# Patient Record
Sex: FEMALE | Race: BLACK OR AFRICAN AMERICAN | Employment: OTHER | URBAN - METROPOLITAN AREA
[De-identification: names, ages, dates, MRNs, and addresses within clinical notes are randomized per-mention and may not be internally consistent; named-entity substitution may affect disease eponyms.]

---

## 2016-09-08 LAB — LDL-C, EXTERNAL: 60

## 2017-01-11 ENCOUNTER — HOSPITAL ENCOUNTER (OUTPATIENT)
Dept: LAB | Age: 77
Discharge: HOME | End: 2017-01-11
Payer: MEDICARE

## 2017-01-11 PROCEDURE — 84443 ASSAY THYROID STIM HORMONE: CPT

## 2017-01-11 PROCEDURE — 36415 COLL VENOUS BLD VENIPUNCTURE: CPT

## 2017-01-11 PROCEDURE — 86376 MICROSOMAL ANTIBODY EACH: CPT

## 2017-01-11 PROCEDURE — 84439 ASSAY OF FREE THYROXINE: CPT

## 2017-01-18 ENCOUNTER — OFFICE VISIT (OUTPATIENT)
Dept: ENDOCRINOLOGY | Age: 77
End: 2017-01-18

## 2017-01-18 ENCOUNTER — HOSPITAL ENCOUNTER (OUTPATIENT)
Dept: LAB | Age: 77
Discharge: HOME | End: 2017-01-18

## 2017-01-18 VITALS
DIASTOLIC BLOOD PRESSURE: 69 MMHG | HEART RATE: 71 BPM | SYSTOLIC BLOOD PRESSURE: 121 MMHG | TEMPERATURE: 97.5 F | HEIGHT: 65 IN | RESPIRATION RATE: 18 BRPM | BODY MASS INDEX: 34.16 KG/M2 | OXYGEN SATURATION: 95 % | WEIGHT: 205 LBS

## 2017-01-18 DIAGNOSIS — E04.9 NODULAR GOITER: ICD-10-CM

## 2017-01-18 DIAGNOSIS — E05.90 HYPERTHYROIDISM: Primary | ICD-10-CM

## 2017-01-18 RX ORDER — DORZOLAMIDE HYDROCHLORIDE AND TIMOLOL MALEATE 20; 5 MG/ML; MG/ML
1 SOLUTION/ DROPS OPHTHALMIC 2 TIMES DAILY
Refills: 0 | COMMUNITY
Start: 2017-01-16

## 2017-01-18 NOTE — PROGRESS NOTES
[facility] DIABETES & ENDOCRINOLOGY  OFFICE PROCEDURE PROGRESS NOTE        Chart reviewed for the following:   eLydi Stubbs MD, have reviewed the History, Physical and updated the Allergic reactions for Stephanie Conteh     TIME OUT performed immediately prior to start of procedure:   Leydi Stubbs MD, have performed the following reviews on Kaye Roche prior to the start of the procedure:            * Patient was identified by name and date of birth   * Agreement on procedure being performed was verified  * Risks and Benefits explained to the patient  * Procedure site verified and marked as necessary  * Patient was positioned for comfort  * Consent was signed and verified     Time: 3 pm      Date of procedure: 1/18/2017    Procedure performed by:  Clotilde Pringle MD    Provider assisted by: Shamar Abraham      Real time imaging was performed in both transverse and sagittal planes    Nodule size Left mid pole   Following informed consent, a procedural pause was held to confirm patiient identity and site of biopsy. After sterile preparation, FNA guidance was performed using direct ultrasound guidance to confirm accurate needle placement. 5 aspirations were made using 25 G needles  Samples were submitted to cytology  Patient  tolerated procedure well without complications  After care instructions provided.

## 2017-01-18 NOTE — PATIENT INSTRUCTIONS
Please take tylenol 500 mg or Motrin 400 mg (2 pills of 200 mg dose) OTC,  if there is any biopsy site pain    You can go back to your normal routine immediately    If you notice any dime sized redness, at the biopsy site, it is normal and do not worry     If you have more symptoms and signs requiring emergency assistance,  call 911   or go to Emergency room

## 2017-01-18 NOTE — PROGRESS NOTES
Wt Readings from Last 3 Encounters:   01/18/17 205 lb (93 kg)   10/20/16 205 lb (93 kg)     Temp Readings from Last 3 Encounters:   01/18/17 97.5 °F (36.4 °C) (Oral)   10/20/16 97.3 °F (36.3 °C) (Oral)     BP Readings from Last 3 Encounters:   01/18/17 121/69   10/20/16 127/74     Pulse Readings from Last 3 Encounters:   01/18/17 71   10/20/16 61     Lab Results   Component Value Date/Time    TSH <0.006 01/11/2017 11:07 AM    T4, Free 2.47 01/11/2017 11:07 AM

## 2017-01-18 NOTE — MR AVS SNAPSHOT
Visit Information Date & Time Provider Department Dept. Phone Encounter #  
 1/18/2017  2:00 PM Zuly Marin MD [facility]in[facility] [phone] [id number] Follow-up Instructions Return in about 2 months (around 3/18/2017). Upcoming Health Maintenance Date Due DTaP/Tdap/Td series (1 - Tdap) 10/1/1960 ZOSTER VACCINE AGE 60> 10/1/1999 GLAUCOMA SCREENING Q2Y 10/1/2004 OSTEOPOROSIS SCREENING (DEXA) 10/1/2004 Pneumococcal 65+ Low/Medium Risk (1 of 2 - PCV13) 10/1/2004 MEDICARE YEARLY EXAM 10/1/2004 INFLUENZA AGE 9 TO ADULT 8/1/2016 Allergies as of 1/18/2017  Review Complete On: 1/18/2017 By: Zuly Marin MD  
 Not on File Current Immunizations  Never Reviewed No immunizations on file. Not reviewed this visit You Were Diagnosed With   
  
 Codes Comments Hyperthyroidism    -  Primary ICD-10-CM: E05.90 ICD-9-CM: 242.90 Nodular goiter     ICD-10-CM: E04.9 ICD-9-CM: 500. 9 Vitals BP Pulse Temp Resp Height(growth percentile) Weight(growth percentile) 121/69 71 97.5 °F (36.4 °C) (Oral) 18 5' 5\" (1.651 m) 205 lb (93 kg) SpO2 BMI OB Status Smoking Status 95% 34.11 kg/m2 Postmenopausal Former Smoker BMI and BSA Data Body Mass Index Body Surface Area  
 34.11 kg/m 2 2.07 m 2 Your Updated Medication List  
  
   
This list is accurate as of: 1/18/17  3:06 PM.  Always use your most recent med list. amLODIPine 10 mg tablet Commonly known as:  Sonam Fraction Take 1 tablet every day by oral route in the morning for 90 days. atorvastatin 20 mg tablet Commonly known as:  LIPITOR  
atorvastatin 20 mg tablet  
  
 dorzolamide-timolol 22.3-6.8 mg/mL ophthalmic solution Commonly known as:  COSOPT  
  
 hydroCHLOROthiazide 25 mg tablet Commonly known as:  HYDRODIURIL Take 1 tablet every day by oral route in the morning for 90 days. latanoprost 0.005 % ophthalmic solution Commonly known as:  XALATAN  
latanoprost 0.005 % eye drops We Performed the Following FINE NEEDLE ASP;W/IMAGING GUIDANCE C0036536 CPT(R)] SONO GUIDE NEEDLE BIOPSY [19089 CPT(R)] Follow-up Instructions Return in about 2 months (around 3/18/2017). To-Do List   
 01/18/2017 Imaging:  NM THYROID IMAGE UPT SNGL/MULTI Patient Instructions Please take tylenol 500 mg or Motrin 400 mg (2 pills of 200 mg dose) OTC,  if there is any biopsy site pain You can go back to your normal routine immediately If you notice any dime sized redness, at the biopsy site, it is normal and do not worry If you have more symptoms and signs requiring emergency assistance,  call 911   or go to Emergency room Introducing [facility] & HEALTH SERVICES! Cortney Parekh introduces Narr8 patient portal. Now you can access parts of your medical record, email your doctor's office, and request medication refills online. 1. In your internet browser, go to [email or web address] 2. Click on the First Time User? Click Here link in the Sign In box. You will see the New Member Sign Up page. 3. Enter your Narr8 Access Code exactly as it appears below. You will not need to use this code after youve completed the sign-up process. If you do not sign up before the expiration date, you must request a new code. · Narr8 Access Code: [email or web address] Expires: 4/18/2017  3:04 PM 
 
4. Enter the last four digits of your Social Security Number (xxxx) and Date of Birth (mm/dd/yyyy) as indicated and click Submit. You will be taken to the next sign-up page. 5. Create a Narr8 ID. This will be your Narr8 login ID and cannot be changed, so think of one that is secure and easy to remember. 6. Create a Narr8 password. You can change your password at any time. 7. Enter your Password Reset Question and Answer. This can be used at a later time if you forget your password. 8. Enter your e-mail address. You will receive e-mail notification when new information is available in [address] 9. Click Sign Up. You can now view and download portions of your medical record. 10. Click the Download Summary menu link to download a portable copy of your medical information. If you have questions, please visit the Frequently Asked Questions section of the [employer] website. Remember, [employer] is NOT to be used for urgent needs. For medical emergencies, dial 911. Now available from your iPhone and Android! Please provide this summary of care documentation to your next provider. Your primary care clinician is listed as Speedy Wood. If you have any questions after today's visit, please call [phone].

## 2017-01-19 NOTE — PROGRESS NOTES
HISTORY OF PRESENT ILLNESS  Manuel Jessica is a 67 y.o. female. HPI     F/u after Initial visit for low TSH from sept 2016  She has hyper thyroid symptoms like palpitations, weight loss, irritability       She is here for FNA   Her labs showed hyperthyroidism     Past Medical History   Diagnosis Date    Glaucoma      right eye    HTN (hypertension)     Hyperlipidemia     Thyroid condition        Social History     Social History    Marital status: SINGLE     Spouse name: N/A    Number of children: N/A    Years of education: N/A     Occupational History    Not on file. Social History Main Topics    Smoking status: Former Smoker    Smokeless tobacco: Not on file    Alcohol use No    Drug use: No    Sexual activity: Not on file     Other Topics Concern    Not on file     Social History Narrative       History reviewed. No pertinent family history. Review of Systems   Constitutional: Negative. HENT: Negative. Eyes: Negative for pain and redness. Respiratory: Negative. Cardiovascular: Negative for chest pain, palpitations and leg swelling. Gastrointestinal: Negative. Negative for constipation. Genitourinary: Negative. Musculoskeletal: Negative for myalgias. Skin: Negative. Neurological: Negative. Endo/Heme/Allergies: Negative. Psychiatric/Behavioral: Negative for depression and memory loss. The patient does not have insomnia. Physical Exam   Constitutional: She is oriented to person, place, and time. She appears well-developed and well-nourished. HENT:   Head: Normocephalic. Eyes: Conjunctivae and EOM are normal. Pupils are equal, round, and reactive to light. Neck: Normal range of motion. Neck supple. No JVD present. No tracheal deviation present. Thyromegaly present. Cardiovascular: Normal rate, regular rhythm and normal heart sounds. No murmur heard. Pulmonary/Chest: Breath sounds normal.   Abdominal: Soft.  Bowel sounds are normal. Musculoskeletal: Normal range of motion. Lymphadenopathy:     She has no cervical adenopathy. Neurological: She is alert and oriented to person, place, and time. She has normal reflexes. Skin: Skin is warm. Psychiatric: She has a normal mood and affect.       usg from date oct 18 2016 :  Ist is thick at 6 mm and has 0.9 cm nodule   3 left lobe nodules, the superior one is 1.2 cm, middle 3.3 cm and inferior one 1.1 cm     Lab Results  Component Value Date/Time   TSH <0.006 01/11/2017 11:07 AM   T4, Free 2.47 01/11/2017 11:07 AM          ASSESSMENT and PLAN    Hyperthyroidism :  The differentiol includes, Or early graves   Or thyroiditis   Or MNG, toxic     She is symptomatic   Explained to her the need for thyrodi uptake and scan       Educated patient about this disease, adverse effects of untreated hyperthyroidism especially on heart and bone. Discussed options of FISCHER therapy and anti-thyroid drugs. May not participate in vigorous physical activities. lab results reviewed with patient, repeat labs ordered prior to next appointment  orders and follow up discussed with patient      > 50 % of time is spent on counseling           > 50 % of time is spent on counseling

## 2017-01-26 ENCOUNTER — HOSPITAL ENCOUNTER (OUTPATIENT)
Dept: NUCLEAR MEDICINE | Age: 77
Discharge: HOME | End: 2017-01-26
Attending: INTERNAL MEDICINE
Payer: MEDICARE

## 2017-01-26 DIAGNOSIS — E04.9 NODULAR GOITER: ICD-10-CM

## 2017-01-26 DIAGNOSIS — E05.90 HYPERTHYROIDISM: ICD-10-CM

## 2017-01-26 PROCEDURE — A9516 IODINE I-123 SOD IODIDE MIC: HCPCS

## 2017-01-27 ENCOUNTER — HOSPITAL ENCOUNTER (OUTPATIENT)
Dept: NUCLEAR MEDICINE | Age: 77
Discharge: HOME | End: 2017-01-27
Attending: INTERNAL MEDICINE
Payer: MEDICARE

## 2017-01-27 PROCEDURE — 78014 THYROID IMAGING W/BLOOD FLOW: CPT

## 2017-02-17 NOTE — PROGRESS NOTES
I spoke to pt , myself and she is interested in f/u next week Thursday at 3.30 pm also to discuss her thyroid scan results

## 2017-02-23 ENCOUNTER — HOSPITAL ENCOUNTER (OUTPATIENT)
Dept: LAB | Age: 77
Discharge: HOME | End: 2017-02-23
Payer: MEDICARE

## 2017-02-23 ENCOUNTER — OFFICE VISIT (OUTPATIENT)
Dept: ENDOCRINOLOGY | Age: 77
End: 2017-02-23

## 2017-02-23 VITALS
OXYGEN SATURATION: 95 % | WEIGHT: 197 LBS | SYSTOLIC BLOOD PRESSURE: 151 MMHG | HEART RATE: 63 BPM | RESPIRATION RATE: 18 BRPM | HEIGHT: 65 IN | TEMPERATURE: 95.9 F | BODY MASS INDEX: 32.82 KG/M2 | DIASTOLIC BLOOD PRESSURE: 79 MMHG

## 2017-02-23 DIAGNOSIS — E05.00 TOXIC GOITER: Primary | ICD-10-CM

## 2017-02-23 DIAGNOSIS — E06.3 HASHITOXICOSIS: ICD-10-CM

## 2017-02-23 DIAGNOSIS — E04.2 MULTIPLE THYROID NODULES: ICD-10-CM

## 2017-02-23 DIAGNOSIS — E05.80 HASHITOXICOSIS: ICD-10-CM

## 2017-02-23 PROCEDURE — 84439 ASSAY OF FREE THYROXINE: CPT

## 2017-02-23 PROCEDURE — 84443 ASSAY THYROID STIM HORMONE: CPT

## 2017-02-23 NOTE — PROGRESS NOTES
Wt Readings from Last 3 Encounters:   02/23/17 197 lb (89.4 kg)   01/18/17 205 lb (93 kg)   10/20/16 205 lb (93 kg)     Temp Readings from Last 3 Encounters:   02/23/17 95.9 °F (35.5 °C) (Oral)   01/18/17 97.5 °F (36.4 °C) (Oral)   10/20/16 97.3 °F (36.3 °C) (Oral)     BP Readings from Last 3 Encounters:   02/23/17 151/79   01/18/17 121/69   10/20/16 127/74     Pulse Readings from Last 3 Encounters:   02/23/17 63   01/18/17 71   10/20/16 61

## 2017-02-23 NOTE — MR AVS SNAPSHOT
Visit Information Date & Time Provider Department Dept. Phone Encounter #  
 2/23/2017  3:30 PM Varinder Gil MD [facility] Diabetes & Endocrinology [phone] [id number] Follow-up Instructions Return in about 2 months (around 4/23/2017). Upcoming Health Maintenance Date Due DTaP/Tdap/Td series (1 - Tdap) 10/1/1960 ZOSTER VACCINE AGE 60> 10/1/1999 GLAUCOMA SCREENING Q2Y 10/1/2004 OSTEOPOROSIS SCREENING (DEXA) 10/1/2004 Pneumococcal 65+ Low/Medium Risk (1 of 2 - PCV13) 10/1/2004 MEDICARE YEARLY EXAM 10/1/2004 INFLUENZA AGE 9 TO ADULT 8/1/2016 Allergies as of 2/23/2017  Review Complete On: 2/23/2017 By: Enrique Du LPN No Known Allergies Current Immunizations  Never Reviewed No immunizations on file. Not reviewed this visit You Were Diagnosed With   
  
 Codes Comments Toxic goiter    -  Primary ICD-10-CM: E05.00 ICD-9-CM: 242.00 Hashitoxicosis     ICD-10-CM: E05.80, E06.3 ICD-9-CM: 242.80 Vitals BP  
  
  
  
  
  
 151/79 BMI and BSA Data Body Mass Index Body Surface Area 32.78 kg/m 2 2.02 m 2 Your Updated Medication List  
  
   
This list is accurate as of: 2/23/17  5:20 PM.  Always use your most recent med list. amLODIPine 10 mg tablet Commonly known as:  Morrow Freeman Take 1 tablet every day by oral route in the morning for 90 days. atorvastatin 20 mg tablet Commonly known as:  LIPITOR  
atorvastatin 20 mg tablet  
  
 dorzolamide-timolol 22.3-6.8 mg/mL ophthalmic solution Commonly known as:  COSOPT  
  
 hydroCHLOROthiazide 25 mg tablet Commonly known as:  HYDRODIURIL Take 1 tablet every day by oral route in the morning for 90 days. latanoprost 0.005 % ophthalmic solution Commonly known as:  XALATAN  
latanoprost 0.005 % eye drops We Performed the Following T4, FREE [id number] CPT(R)] TSH 3RD GENERATION [89974 CPT(R)] Follow-up Instructions Return in about 2 months (around 4/23/2017). Introducing [facility] & HEALTH SERVICES! Alecia Nicholas introduces [employer] Current patient portal. Now you can access parts of your medical record, email your doctor's office, and request medication refills online. 1. In your internet browser, go to [email or web address] 2. Click on the First Time User? Click Here link in the Sign In box. You will see the New Member Sign Up page. 3. Enter your Cross Current Access Code exactly as it appears below. You will not need to use this code after youve completed the sign-up process. If you do not sign up before the expiration date, you must request a new code. · Cross Current Access Code: [email or web address] Expires: 4/18/2017  3:04 PM 
 
4. Enter the last four digits of your Social Security Number (xxxx) and Date of Birth (mm/dd/yyyy) as indicated and click Submit. You will be taken to the next sign-up page. 5. Create a Cross Current ID. This will be your Cross Current login ID and cannot be changed, so think of one that is secure and easy to remember. 6. Create a Cross Current password. You can change your password at any time. 7. Enter your Password Reset Question and Answer. This can be used at a later time if you forget your password. 8. Enter your e-mail address. You will receive e-mail notification when new information is available in [address] 9. Click Sign Up. You can now view and download portions of your medical record. 10. Click the Download Summary menu link to download a portable copy of your medical information. If you have questions, please visit the Frequently Asked Questions section of the Cross Current website. Remember, Cross Current is NOT to be used for urgent needs. For medical emergencies, dial 911. Now available from your iPhone and Android! Please provide this summary of care documentation to your next provider. Your primary care clinician is listed as Rico Pink. If you have any questions after today's visit, please call [phone].

## 2017-02-23 NOTE — PROGRESS NOTES
HISTORY OF PRESENT ILLNESS  Khadar Leon is a 67 y.o. female. HPI     F/u after last visit for low TSH from jan 2017  She had FNA done on jan 18 2017 - results not discussed with her     She had to be called several times to get her into this visit   She has hyper thyroid symptoms like palpitations, weight loss, irritability   She had labs showing hyperthyroid state - ordered thyrodi scan, not discussed        Past Medical History:   Diagnosis Date    Glaucoma     right eye    HTN (hypertension)     Hyperlipidemia     Thyroid condition        Social History     Social History    Marital status: SINGLE     Spouse name: N/A    Number of children: N/A    Years of education: N/A     Occupational History    Not on file. Social History Main Topics    Smoking status: Former Smoker    Smokeless tobacco: Not on file    Alcohol use No    Drug use: No    Sexual activity: Not on file     Other Topics Concern    Not on file     Social History Narrative       No family history on file. Review of Systems   Constitutional: Negative. HENT: Negative. Eyes: Negative for pain and redness. Respiratory: Negative. Cardiovascular: Negative for chest pain, palpitations and leg swelling. Gastrointestinal: Negative. Negative for constipation. Genitourinary: Negative. Musculoskeletal: Negative for myalgias. Skin: Negative. Neurological: Negative. Endo/Heme/Allergies: Negative. Psychiatric/Behavioral: Negative for depression and memory loss. The patient does not have insomnia. Physical Exam   Constitutional: She is oriented to person, place, and time. She appears well-developed and well-nourished. HENT:   Head: Normocephalic. Eyes: Conjunctivae and EOM are normal. Pupils are equal, round, and reactive to light. Neck: Normal range of motion. Neck supple. No JVD present. No tracheal deviation present. Thyromegaly present.    Cardiovascular: Normal rate, regular rhythm and normal heart sounds. No murmur heard. Pulmonary/Chest: Breath sounds normal.   Abdominal: Soft. Bowel sounds are normal.   Musculoskeletal: Normal range of motion. Lymphadenopathy:     She has no cervical adenopathy. Neurological: She is alert and oriented to person, place, and time. She has normal reflexes. Skin: Skin is warm. Psychiatric: She has a normal mood and affect.       usg from date oct 18 2016 :  Ist is thick at 6 mm and has 0.9 cm nodule   3 left lobe nodules, the superior one is 1.2 cm, middle 3.3 cm and inferior one 1.1 cm       The scan showed diffuse uptake of 40 %         Lab Results   Component Value Date/Time    TSH <0.006 01/11/2017 11:07 AM    T4, Free 2.47 01/11/2017 11:07 AM          ASSESSMENT and PLAN    Hyperthyroidism :  The differentiol includes,  early graves   Or thyroiditis Or MNG, toxic     She is symptomatic    thyrodi uptake and scan favoring Graves disease and Hashitoxicosis       Educated patient about this disease, adverse effects of untreated hyperthyroidism especially on heart and bone. Discussed options of FISCHER therapy and anti-thyroid drugs. Consider Starting on tapazole 10 mg bid, after today's labs confirming hyperthyroidism       May not participate in vigorous physical activities. lab results reviewed with patient, repeat labs ordered prior to next appointment  orders and follow up discussed with patient    2.   Mult thyr nodules, on left lobe   S/p FNA of dominant nodule  Left side --  Got incomplete sample, but the cells which so far were drawn was Benign, reassured the pt         > 50 % of time is spent on counseling       F/u  In 2 months

## 2017-02-24 LAB
T4 FREE SERPL-MCNC: 2.69 NG/DL (ref 0.82–1.77)
TSH SERPL DL<=0.005 MIU/L-ACNC: <0.006 UIU/ML (ref 0.45–4.5)

## 2017-02-25 PROBLEM — E04.2 MULTIPLE THYROID NODULES: Status: ACTIVE | Noted: 2017-02-25

## 2017-03-01 RX ORDER — METHIMAZOLE 10 MG/1
10 TABLET ORAL 2 TIMES DAILY
Qty: 60 TAB | Refills: 6 | Status: SHIPPED | OUTPATIENT
Start: 2017-03-01 | End: 2017-05-08 | Stop reason: SDUPTHER

## 2017-03-01 NOTE — TELEPHONE ENCOUNTER
----- Message from Adalberto Martinez MD sent at 2/24/2017  8:43 AM EST -----  Ok, no change in labs since jan 2017- let us start her on tapazole 10 mg bid dosing

## 2017-05-01 ENCOUNTER — HOSPITAL ENCOUNTER (OUTPATIENT)
Dept: LAB | Age: 77
Discharge: HOME | End: 2017-05-01
Payer: MEDICARE

## 2017-05-01 PROCEDURE — 84443 ASSAY THYROID STIM HORMONE: CPT

## 2017-05-01 PROCEDURE — 84439 ASSAY OF FREE THYROXINE: CPT

## 2017-05-01 PROCEDURE — 36415 COLL VENOUS BLD VENIPUNCTURE: CPT

## 2017-05-08 ENCOUNTER — OFFICE VISIT (OUTPATIENT)
Dept: ENDOCRINOLOGY | Age: 77
End: 2017-05-08

## 2017-05-08 VITALS
RESPIRATION RATE: 20 BRPM | HEIGHT: 65 IN | DIASTOLIC BLOOD PRESSURE: 79 MMHG | TEMPERATURE: 96.8 F | BODY MASS INDEX: 33.15 KG/M2 | WEIGHT: 199 LBS | HEART RATE: 60 BPM | OXYGEN SATURATION: 98 % | SYSTOLIC BLOOD PRESSURE: 125 MMHG

## 2017-05-08 DIAGNOSIS — E05.20 TOXIC MULTINODULAR GOITER: Primary | ICD-10-CM

## 2017-05-08 DIAGNOSIS — R06.02 SOB (SHORTNESS OF BREATH): ICD-10-CM

## 2017-05-08 RX ORDER — METHIMAZOLE 10 MG/1
15 TABLET ORAL 2 TIMES DAILY
Qty: 45 TAB | Refills: 6 | Status: SHIPPED | OUTPATIENT
Start: 2017-05-08 | End: 2017-09-08 | Stop reason: ALTCHOICE

## 2017-05-08 NOTE — MR AVS SNAPSHOT
Visit Information Date & Time Provider Department Dept. Phone Encounter #  
 5/8/2017  1:15 PM Ike Palmer MD [facility] Diabetes & Endocrinology [phone] [id number] Follow-up Instructions Return in about 4 months (around 9/8/2017). Upcoming Health Maintenance Date Due DTaP/Tdap/Td series (1 - Tdap) 10/1/1960 ZOSTER VACCINE AGE 60> 10/1/1999 GLAUCOMA SCREENING Q2Y 10/1/2004 OSTEOPOROSIS SCREENING (DEXA) 10/1/2004 Pneumococcal 65+ Low/Medium Risk (1 of 2 - PCV13) 10/1/2004 MEDICARE YEARLY EXAM 10/1/2004 INFLUENZA AGE 9 TO ADULT 8/1/2017 Allergies as of 5/8/2017  Review Complete On: 5/8/2017 By: Ike Palmer MD  
 No Known Allergies Current Immunizations  Never Reviewed No immunizations on file. Not reviewed this visit You Were Diagnosed With   
  
 Codes Comments Toxic multinodular goiter    -  Primary ICD-10-CM: E05.20 ICD-9-CM: 242.20 SOB (shortness of breath)     ICD-10-CM: R06.02 
ICD-9-CM: 786.05 Vitals BP Pulse Temp Resp Height(growth percentile) Weight(growth percentile) 125/79 60 96.8 °F (36 °C) (Oral) 20 5' 5\" (1.651 m) 199 lb (90.3 kg) SpO2 BMI OB Status Smoking Status 98% 33.12 kg/m2 Postmenopausal Former Smoker BMI and BSA Data Body Mass Index Body Surface Area  
 33.12 kg/m 2 2.04 m 2 Preferred Pharmacy Pharmacy Name Phone [id number] [phone] Your Updated Medication List  
  
   
This list is accurate as of: 5/8/17  1:34 PM.  Always use your most recent med list. amLODIPine 10 mg tablet Commonly known as:  Belen Credit Take 1 tablet every day by oral route in the morning for 90 days. atorvastatin 20 mg tablet Commonly known as:  LIPITOR  
atorvastatin 20 mg tablet  
  
 dorzolamide-timolol 22.3-6.8 mg/mL ophthalmic solution Commonly known as:  COSOPT  
  
 hydroCHLOROthiazide 25 mg tablet Commonly known as:  HYDRODIURIL Take 1 tablet every day by oral route in the morning for 90 days. latanoprost 0.005 % ophthalmic solution Commonly known as:  XALATAN  
latanoprost 0.005 % eye drops  
  
 methIMAzole 10 mg tablet Commonly known as:  TAPAZOLE Take 1.5 Tabs by mouth two (2) times a day. Note the decrease Prescriptions Sent to Pharmacy Refills  
 methIMAzole (TAPAZOLE) 10 mg tablet 6 Sig: Take 1.5 Tabs by mouth two (2) times a day. Note the decrease Class: Normal  
 Pharmacy: [id number] [address] #: [phone] Route: Oral  
  
Follow-up Instructions Return in about 4 months (around 9/8/2017). Patient Instructions Change TAPAZOLE 10 mg  To one and half pills once a day Introducing [facility] & HE[facility] SERVICES! Cristina Mejia introduces Withings patient portal. Now you can access parts of your medical record, email your doctor's office, and request medication refills online. 1. In your internet browser, go to [email or web address] 2. Click on the First Time User? Click Here link in the Sign In box. You will see the New Member Sign Up page. 3. Enter your Withings Access Code exactly as it appears below. You will not need to use this code after youve completed the sign-up process. If you do not sign up before the expiration date, you must request a new code. · Withings Access Code: [email or web address] Expires: 8/6/2017  1:34 PM 
 
4. Enter the last four digits of your Social Security Number (xxxx) and Date of Birth (mm/dd/yyyy) as indicated and click Submit. You will be taken to the next sign-up page. 5. Create a Infotopt ID. This will be your Withings login ID and cannot be changed, so think of one that is secure and easy to remember. 6. Create a Infotopt password. You can change your password at any time. 7. Enter your Password Reset Question and Answer. This can be used at a later time if you forget your password. 8. Enter your e-mail address. You will receive e-mail notification when new information is available in [address] 9. Click Sign Up. You can now view and download portions of your medical record. 10. Click the Download Summary menu link to download a portable copy of your medical information. If you have questions, please visit the Frequently Asked Questions section of the [employer] website. Remember, [employer] is NOT to be used for urgent needs. For medical emergencies, dial 911. Now available from your iPhone and Android! Please provide this summary of care documentation to your next provider. Your primary care clinician is listed as Pattie Shaw. If you have any questions after today's visit, please call [phone].

## 2017-05-08 NOTE — PROGRESS NOTES
Wt Readings from Last 3 Encounters:   05/08/17 199 lb (90.3 kg)   02/23/17 197 lb (89.4 kg)   01/18/17 205 lb (93 kg)     Temp Readings from Last 3 Encounters:   05/08/17 96.8 °F (36 °C) (Oral)   02/23/17 95.9 °F (35.5 °C) (Oral)   01/18/17 97.5 °F (36.4 °C) (Oral)     BP Readings from Last 3 Encounters:   05/08/17 125/79   02/23/17 151/79   01/18/17 121/69     Pulse Readings from Last 3 Encounters:   05/08/17 60   02/23/17 63   01/18/17 71     Lab Results   Component Value Date/Time    TSH 3.040 05/01/2017 09:13 AM    T4, Free 0.98 05/01/2017 09:13 AM

## 2017-05-08 NOTE — PROGRESS NOTES
HISTORY OF PRESENT ILLNESS  Marcia Waggoner is a 67 y.o. female. HPI     F/u after last visit for low TSH from Feb 23 2017    She has hyper thyroid symptoms like palpitations, weight loss, irritability         Past Medical History:   Diagnosis Date    Glaucoma     right eye    HTN (hypertension)     Hyperlipidemia     Thyroid condition        Social History     Social History    Marital status: SINGLE     Spouse name: N/A    Number of children: N/A    Years of education: N/A     Occupational History    Not on file. Social History Main Topics    Smoking status: Former Smoker    Smokeless tobacco: Not on file    Alcohol use No    Drug use: No    Sexual activity: Not on file     Other Topics Concern    Not on file     Social History Narrative       No family history on file. Review of Systems   Constitutional: Negative. HENT: Negative. Eyes: Negative for pain and redness. Respiratory: Negative. Cardiovascular: Negative for chest pain, palpitations and leg swelling. Gastrointestinal: Negative. Negative for constipation. Genitourinary: Negative. Musculoskeletal: Negative for myalgias. Skin: Negative. Neurological: Negative. Endo/Heme/Allergies: Negative. Psychiatric/Behavioral: Negative for depression and memory loss. The patient does not have insomnia. Physical Exam   Constitutional: She is oriented to person, place, and time. She appears well-developed and well-nourished. HENT:   Head: Normocephalic. Eyes: Conjunctivae and EOM are normal. Pupils are equal, round, and reactive to light. Neck: Normal range of motion. Neck supple. No JVD present. No tracheal deviation present. Thyromegaly present. Cardiovascular: Normal rate, regular rhythm and normal heart sounds. No murmur heard. Pulmonary/Chest: Breath sounds normal.   Abdominal: Soft. Bowel sounds are normal.   Musculoskeletal: Normal range of motion.    Lymphadenopathy:     She has no cervical adenopathy. Neurological: She is alert and oriented to person, place, and time. She has normal reflexes. Skin: Skin is warm. Psychiatric: She has a normal mood and affect.       usg from date oct 18 2016 :  Ist is thick at 6 mm and has 0.9 cm nodule   3 left lobe nodules, the superior one is 1.2 cm, middle 3.3 cm and inferior one 1.1 cm       The scan showed diffuse uptake of 40 %         Lab Results   Component Value Date/Time    TSH 3.040 05/01/2017 09:13 AM    T4, Free 0.98 05/01/2017 09:13 AM          ASSESSMENT and PLAN    Hyperthyroidism :  The differentiol includes,  early graves   Or thyroiditis Or MNG, toxic     She is symptomatic with SOB    thyrodi uptake and scan favoring Graves disease and Hashitoxicosis       Educated patient about this disease, adverse effects of untreated hyperthyroidism especially on heart and bone. Discussed options of FISCHER therapy and anti-thyroid drugs. Started  on tapazole 10 mg bid, after last visit  labs confirming hyperthyroidism   She will continue on the same dose     May not participate in vigorous physical activities. lab results reviewed with patient, repeat labs ordered prior to next appointment  orders and follow up discussed with patient    2. Multiple  Thyroid  nodules, on left lobe   S/p FNA of dominant nodule  Left side --  Got incomplete sample, but the cells which so far were drawn was Benign, reassured the pt     3.  SOB - she says she got evaluated by pcp for cardiac etiology   She is told to f/u with pcp     > 50 % of time is spent on counseling       F/u  In 4 months

## 2017-05-31 ENCOUNTER — DOCUMENTATION ONLY (OUTPATIENT)
Dept: ENDOCRINOLOGY | Age: 77
End: 2017-05-31

## 2017-09-01 ENCOUNTER — HOSPITAL ENCOUNTER (OUTPATIENT)
Dept: LAB | Age: 77
Discharge: HOME | End: 2017-09-01
Payer: MEDICARE

## 2017-09-01 PROCEDURE — 84439 ASSAY OF FREE THYROXINE: CPT

## 2017-09-01 PROCEDURE — 84443 ASSAY THYROID STIM HORMONE: CPT

## 2017-09-01 PROCEDURE — 36415 COLL VENOUS BLD VENIPUNCTURE: CPT

## 2017-09-08 ENCOUNTER — OFFICE VISIT (OUTPATIENT)
Dept: ENDOCRINOLOGY | Age: 77
End: 2017-09-08

## 2017-09-08 VITALS
HEART RATE: 51 BPM | DIASTOLIC BLOOD PRESSURE: 67 MMHG | BODY MASS INDEX: 34.66 KG/M2 | SYSTOLIC BLOOD PRESSURE: 123 MMHG | WEIGHT: 208 LBS | TEMPERATURE: 97.8 F | HEIGHT: 65 IN | RESPIRATION RATE: 16 BRPM

## 2017-09-08 DIAGNOSIS — E03.2 HYPOTHYROIDISM DUE TO MEDICATION: Primary | ICD-10-CM

## 2017-09-08 DIAGNOSIS — E05.90 HYPERTHYROIDISM: ICD-10-CM

## 2017-09-08 RX ORDER — LEVOTHYROXINE SODIUM 50 UG/1
50 TABLET ORAL
Qty: 30 TAB | Refills: 6 | Status: SHIPPED | OUTPATIENT
Start: 2017-09-08 | End: 2017-09-08 | Stop reason: CLARIF

## 2017-09-08 RX ORDER — LEVOTHYROXINE SODIUM 50 UG/1
50 TABLET ORAL
Qty: 30 TAB | Refills: 6 | Status: SHIPPED | OUTPATIENT
Start: 2017-09-08 | End: 2018-06-15 | Stop reason: SDUPTHER

## 2017-09-08 NOTE — MR AVS SNAPSHOT
Visit Information Date & Time Provider Department Dept. Phone Encounter #  
 9/8/2017  1:30 PM Miguel A Bonilla MD [facility] [phone] [id number] Follow-up Instructions Return in about 3 months (around 12/8/2017). Upcoming Health Maintenance Date Due DTaP/Tdap/Td series (1 - Tdap) 10/1/1960 ZOSTER VACCINE AGE 60> 8/1/1999 GLAUCOMA SCREENING Q2Y 10/1/2004 OSTEOPOROSIS SCREENING (DEXA) 10/1/2004 Pneumococcal 65+ Low/Medium Risk (1 of 2 - PCV13) 10/1/2004 MEDICARE YEARLY EXAM 10/1/2004 INFLUENZA AGE 9 TO ADULT 8/1/2017 Allergies as of 9/8/2017  Review Complete On: 9/8/2017 By: Miguel A Bonilla MD  
 No Known Allergies Current Immunizations  Never Reviewed No immunizations on file. Not reviewed this visit You Were Diagnosed With   
  
 Codes Comments Hypothyroidism due to medication    -  Primary ICD-10-CM: E03.2 ICD-9-CM: 244.8, E980.5 Hyperthyroidism     ICD-10-CM: E05.90 ICD-9-CM: 242.90 Vitals BP Pulse Temp Resp Height(growth percentile) Weight(growth percentile) 123/67 (BP 1 Location: Left arm, BP Patient Position: Sitting) (!) 51 97.8 °F (36.6 °C) (Oral) 16 5' 5\" (1.651 m) 208 lb (94.3 kg) BMI OB Status Smoking Status 34.61 kg/m2 Postmenopausal Former Smoker BMI and BSA Data Body Mass Index Body Surface Area  
 34.61 kg/m 2 2.08 m 2 Preferred Pharmacy Pharmacy Name Phone [id number] [address] [place] [phone] Your Updated Medication List  
  
   
This list is accurate as of: 9/8/17  2:03 PM.  Always use your most recent med list. amLODIPine 10 mg tablet Commonly known as:  Fortino Hancock Take 1 tablet every day by oral route in the morning for 90 days. atorvastatin 20 mg tablet Commonly known as:  LIPITOR  
atorvastatin 20 mg tablet dorzolamide-timolol 22.3-6.8 mg/mL ophthalmic solution Commonly known as:  COSOPT  
  
 hydroCHLOROthiazide 25 mg tablet Commonly known as:  HYDRODIURIL Take 1 tablet every day by oral route in the morning for 90 days. latanoprost 0.005 % ophthalmic solution Commonly known as:  XALATAN  
latanoprost 0.005 % eye drops  
  
 levothyroxine 50 mcg tablet Commonly known as:  SYNTHROID Take 1 Tab by mouth Daily (before breakfast). Stop Tapazole Prescriptions Sent to Pharmacy Refills  
 levothyroxine (SYNTHROID) 50 mcg tablet 6 Sig: Take 1 Tab by mouth Daily (before breakfast). Stop Tapazole Class: Normal  
 Pharmacy: [id number] [address] #: [phone] Route: Oral  
  
Follow-up Instructions Return in about 3 months (around 12/8/2017). Patient Instructions STOP     TAPAZOLE Synthroid 50 mcg  A day, on empty stomach with water only, no other meds or food or drinks   For next half hour Take any kind of vitamins, calcium, iron   Pills  4 hours later Introducing [address]! Pillo Noland introduces [employer] patient portal. Now you can access parts of your medical record, email your doctor's office, and request medication refills online. 1. In your internet browser, go to [email or web address] 2. Click on the First Time User? Click Here link in the Sign In box. You will see the New Member Sign Up page. 3. Enter your [employer] Access Code exactly as it appears below. You will not need to use this code after youve completed the sign-up process. If you do not sign up before the expiration date, you must request a new code. · [employer] Access Code: [email or web address] Expires: 12/7/2017  2:03 PM 
 
4. Enter the last four digits of your Social Security Number (xxxx) and Date of Birth (mm/dd/yyyy) as indicated and click Submit. You will be taken to the next sign-up page. 5. Create a [facility] ID. This will be your [facility] login ID and cannot be changed, so think of one that is secure and easy to remember. 6. Create a [facility] password. You can change your password at any time. 7. Enter your Password Reset Question and Answer. This can be used at a later time if you forget your password. 8. Enter your e-mail address. You will receive e-mail notification when new information is available in [address] 9. Click Sign Up. You can now view and download portions of your medical record. 10. Click the Download Summary menu link to download a portable copy of your medical information. If you have questions, please visit the Frequently Asked Questions section of the [employer] website. Remember, [facility] is NOT to be used for urgent needs. For medical emergencies, dial 911. Now available from your iPhone and Android! Please provide this summary of care documentation to your next provider. Your primary care clinician is listed as Enrique Casas. If you have any questions after today's visit, please call [phone].

## 2017-09-08 NOTE — PATIENT INSTRUCTIONS
STOP     TAPAZOLE     Synthroid 50 mcg  A day, on empty stomach with water only, no other meds or food or drinks   For next half hour     Take any kind of vitamins, calcium, iron   Pills  4 hours later

## 2017-09-08 NOTE — PROGRESS NOTES
HISTORY OF PRESENT ILLNESS  Angelita Courtney is a 67 y.o. female. HPI     F/u after last visit for thyroid disorder from May 8  2017    She has fatigue, myalgias and sleepiness  Severe   Very pleasant lady         Past Medical History:   Diagnosis Date    Glaucoma     right eye    HTN (hypertension)     Hyperlipidemia     Thyroid condition        Social History     Social History    Marital status: SINGLE     Spouse name: N/A    Number of children: N/A    Years of education: N/A     Occupational History    Not on file. Social History Main Topics    Smoking status: Former Smoker    Smokeless tobacco: Never Used    Alcohol use No    Drug use: No    Sexual activity: Not on file     Other Topics Concern    Not on file     Social History Narrative       No family history on file. Review of Systems   Constitutional: Negative. HENT: Negative. Eyes: Negative for pain and redness. Respiratory: Negative. Cardiovascular: Negative for chest pain, palpitations and leg swelling. Gastrointestinal: Negative. Negative for constipation. Genitourinary: Negative. Musculoskeletal: positive for  myalgias. Skin: Negative. Neurological: Negative. Endo/Heme/Allergies: Negative. Psychiatric/Behavioral: Negative for depression and memory loss. The patient does not have insomnia. Physical Exam   Constitutional: She is oriented to person, place, and time. She appears well-developed and well-nourished. HENT:   Head: Normocephalic. Eyes: Conjunctivae and EOM are normal. Pupils are equal, round, and reactive to light. Neck: Normal range of motion. Neck supple. No JVD present. No tracheal deviation present. Thyromegaly present. Cardiovascular: Normal rate, regular rhythm and normal heart sounds. No murmur heard. Pulmonary/Chest: Breath sounds normal.   Abdominal: Soft. Bowel sounds are normal.   Musculoskeletal: Normal range of motion.    Lymphadenopathy:     She has no cervical adenopathy. Neurological: She is alert and oriented to person, place, and time. She has normal reflexes. Skin: Skin is warm. Psychiatric: She has a normal mood and affect.       usg from date oct 18 2016 :  Ist is thick at 6 mm and has 0.9 cm nodule   3 left lobe nodules, the superior one is 1.2 cm, middle 3.3 cm and inferior one 1.1 cm       The scan showed diffuse uptake of 40 %         Lab Results   Component Value Date/Time    TSH 78.850 09/01/2017 01:20 PM    T4, Free 0.34 09/01/2017 01:20 PM          ASSESSMENT and PLAN      Hypothyroidism :  Medication induced  She never changed the dose of tapazole , requested almost 3 months ago   Sp, stoppin gsynthroid 50 mcg a day        Hyperthyroidism :  The differentiol includes,  early graves   Or thyroiditis Or MNG, toxic  She is symptomatic with SOB    Thyrodi uptake and scan favoring Graves disease and Hashitoxicosis     Educated patient about this disease, adverse effects of untreated hyperthyroidism especially on heart and bone. Discussed options of FISCHER therapy and anti-thyroid drugs. Started  on tapazole 10 mg bid, after last visit  labs confirming hyperthyroidism , later increased to 15 mg bid   Then in the interim, reduced dose to one and half pills a day in may 2017   Pt continued on it 15 mg at bid without changing it     Now, she is hypothyroid and had to STOP TAPAZOLE       2.   Multiple  Thyroid  nodules, on left lobe   S/p FNA of dominant nodule  Left side --  Got incomplete sample, but the cells which so far were drawn was Benign, reassured the pt         > 50 % of time is spent on counseling       F/u  In 3 months

## 2017-10-11 RX ORDER — METHIMAZOLE 10 MG/1
TABLET ORAL
Qty: 60 TAB | Refills: 6 | Status: SHIPPED | OUTPATIENT
Start: 2017-10-11 | End: 2017-12-15 | Stop reason: ALTCHOICE

## 2017-10-20 ENCOUNTER — HOSPITAL ENCOUNTER (OUTPATIENT)
Dept: LAB | Age: 77
Discharge: HOME | End: 2017-10-20
Payer: MEDICARE

## 2017-10-20 PROCEDURE — 84443 ASSAY THYROID STIM HORMONE: CPT

## 2017-10-20 PROCEDURE — 84439 ASSAY OF FREE THYROXINE: CPT

## 2017-10-20 PROCEDURE — 36415 COLL VENOUS BLD VENIPUNCTURE: CPT

## 2017-10-23 ENCOUNTER — OP HISTORICAL/CONVERTED ENCOUNTER (OUTPATIENT)
Dept: OTHER | Age: 77
End: 2017-10-23

## 2017-11-22 ENCOUNTER — TELEPHONE (OUTPATIENT)
Dept: ENDOCRINOLOGY | Age: 77
End: 2017-11-22

## 2017-11-22 NOTE — TELEPHONE ENCOUNTER
----- Message from Cintia Parker sent at 11/22/2017 10:30 AM EST -----  Regarding: Nugaram/telephone  Pt is requesting to know if you want her to stop taking the Levothyroxine. Pts number is [phone].

## 2017-11-22 NOTE — TELEPHONE ENCOUNTER
What provoked this call of stopping synthroid  Now ?     She took tapazole too much and had to stop tapazole for a while ( she is hyperthyroid to begin with )    Now, in oct 2017 labs indicate she is euthroid ( off tapazole but not sure was she on synthrodi or off synthroid then )  Had to start synthroid as she was severely hypothyroid because of too much tapazole

## 2017-11-22 NOTE — TELEPHONE ENCOUNTER
Spoke with patient after speaking with  and informed her she should continue taking Levothyroxine as she has been taking and  will review labs at her next office visit December 15, 2017. Patient verbalized understanding.

## 2017-12-08 ENCOUNTER — HOSPITAL ENCOUNTER (OUTPATIENT)
Dept: LAB | Age: 77
Discharge: HOME | End: 2017-12-08
Payer: MEDICARE

## 2017-12-08 PROCEDURE — 84443 ASSAY THYROID STIM HORMONE: CPT

## 2017-12-08 PROCEDURE — 84439 ASSAY OF FREE THYROXINE: CPT

## 2017-12-08 PROCEDURE — 36415 COLL VENOUS BLD VENIPUNCTURE: CPT

## 2017-12-15 ENCOUNTER — OFFICE VISIT (OUTPATIENT)
Dept: ENDOCRINOLOGY | Age: 77
End: 2017-12-15

## 2017-12-15 VITALS
DIASTOLIC BLOOD PRESSURE: 71 MMHG | OXYGEN SATURATION: 96 % | BODY MASS INDEX: 34.79 KG/M2 | RESPIRATION RATE: 16 BRPM | WEIGHT: 208.8 LBS | SYSTOLIC BLOOD PRESSURE: 139 MMHG | HEIGHT: 65 IN | TEMPERATURE: 95.5 F | HEART RATE: 59 BPM

## 2017-12-15 DIAGNOSIS — E04.9 NODULAR GOITER: ICD-10-CM

## 2017-12-15 DIAGNOSIS — E03.4 HYPOTHYROIDISM DUE TO ACQUIRED ATROPHY OF THYROID: Primary | ICD-10-CM

## 2017-12-15 NOTE — PROGRESS NOTES
HISTORY OF PRESENT ILLNESS  Sima Blanco is a 65 y.o. female. HPI     F/u after last visit for thyroid disorder from sept 8 2017    Very pleasant lady     Pt changed to hypothyroidism from hyperthyroidism   She is asymptomatic       Some confusion occured at pharmacy   She caught it early on         Past Medical History:   Diagnosis Date    Glaucoma     right eye    HTN (hypertension)     Hyperlipidemia     Thyroid condition        Social History     Social History    Marital status: SINGLE     Spouse name: N/A    Number of children: N/A    Years of education: N/A     Occupational History    Not on file. Social History Main Topics    Smoking status: Former Smoker    Smokeless tobacco: Never Used    Alcohol use No    Drug use: No    Sexual activity: Not on file     Other Topics Concern    Not on file     Social History Narrative       History reviewed. No pertinent family history. Review of Systems   Constitutional: Negative. HENT: Negative. Eyes: Negative for pain and redness. Respiratory: Negative. Cardiovascular: Negative for chest pain, palpitations and leg swelling. Gastrointestinal: Negative. Negative for constipation. Genitourinary: Negative. Musculoskeletal: positive for  myalgias. Skin: Negative. Neurological: Negative. Endo/Heme/Allergies: Negative. Psychiatric/Behavioral: Negative for depression and memory loss. The patient does not have insomnia. Physical Exam   Constitutional: She is oriented to person, place, and time. She appears well-developed and well-nourished. HENT:   Head: Normocephalic. Eyes: Conjunctivae and EOM are normal. Pupils are equal, round, and reactive to light. Neck: Normal range of motion. Neck supple. No JVD present. No tracheal deviation present. Thyromegaly present. Cardiovascular: Normal rate, regular rhythm and normal heart sounds. No murmur heard.   Pulmonary/Chest: Breath sounds normal.   Abdominal: Soft. Bowel sounds are normal.   Musculoskeletal: Normal range of motion. Lymphadenopathy:     She has no cervical adenopathy. Neurological: She is alert and oriented to person, place, and time. She has normal reflexes. Skin: Skin is warm. Psychiatric: She has a normal mood and affect.       usg from date oct 18 2016 :  Ist is thick at 6 mm and has 0.9 cm nodule   3 left lobe nodules, the superior one is 1.2 cm, middle 3.3 cm and inferior one 1.1 cm       The scan showed diffuse uptake of 40 %         Lab Results   Component Value Date/Time    TSH 1.730 12/08/2017 02:06 PM    T4, Free 1.61 12/08/2017 02:06 PM          ASSESSMENT and PLAN      Hypothyroidism : Euthyroid   On  synthroid 50 mcg a day        Hyperthyroidism :  The differentiol includes,  early graves   Or thyroiditis Or MNG, toxic  She is symptomatic with SOB    Thyrodi uptake and scan favoring Graves disease and Hashitoxicosis     Educated patient about this disease, adverse effects of untreated hyperthyroidism especially on heart and bone. Discussed options of FISCHER therapy and anti-thyroid drugs. Started  on tapazole 10 mg bid, after last visit  labs confirming hyperthyroidism , later increased to 15 mg bid   Then in the interim, reduced dose to one and half pills a day in may 2017   Pt continued on it 15 mg at bid without changing it   Became hypothyroid and had to STOP TAPAZOLE       2.   Multiple  Thyroid  nodules, on left lobe   S/p FNA of dominant nodule  Left side --  Got incomplete sample, but the cells drawn were Benign, reassured the pt         > 50 % of time is spent on counseling   Patient voiced understanding her plan of care

## 2017-12-15 NOTE — PROGRESS NOTES
Chief Complaint   Patient presents with    Thyroid Problem     1. Have you been to the ER, urgent care clinic since your last visit? Hospitalized since your last visit? No    2. Have you seen or consulted any other health care providers outside of the [address] [place] [address] since your last visit? Include any pap smears or colon screening.  No     Wt Readings from Last 3 Encounters:   12/15/17 208 lb 12.8 oz (94.7 kg)   09/08/17 208 lb (94.3 kg)   05/08/17 199 lb (90.3 kg)     Temp Readings from Last 3 Encounters:   12/15/17 95.5 °F (35.3 °C) (Oral)   09/08/17 97.8 °F (36.6 °C) (Oral)   05/08/17 96.8 °F (36 °C) (Oral)     BP Readings from Last 3 Encounters:   12/15/17 139/71   09/08/17 123/67   05/08/17 125/79     Pulse Readings from Last 3 Encounters:   12/15/17 (!) 59   09/08/17 (!) 51   05/08/17 60

## 2017-12-15 NOTE — MR AVS SNAPSHOT
Visit Information Date & Time Provider Department Dept. Phone Encounter #  
 12/15/2017  1:15 PM Liz Reed MD [facility] [place] [facility] [phone] [id number] Follow-up Instructions Return in about 6 months (around 6/15/2018). Upcoming Health Maintenance Date Due DTaP/Tdap/Td series (1 - Tdap) 10/1/1960 ZOSTER VACCINE AGE 60> 8/1/1999 GLAUCOMA SCREENING Q2Y 10/1/2004 OSTEOPOROSIS SCREENING (DEXA) 10/1/2004 Pneumococcal 65+ Low/Medium Risk (1 of 2 - PCV13) 10/1/2004 MEDICARE YEARLY EXAM 10/1/2004 Influenza Age 5 to Adult 8/1/2017 Allergies as of 12/15/2017  Review Complete On: 12/15/2017 By: Liz Reed MD  
 No Known Allergies Current Immunizations  Never Reviewed No immunizations on file. Not reviewed this visit You Were Diagnosed With   
  
 Codes Comments Hypothyroidism due to acquired atrophy of thyroid    -  Primary ICD-10-CM: E03.4 ICD-9-CM: 244.8, 246.8 Vitals BP Pulse Temp Resp Height(growth percentile) Weight(growth percentile) 139/71 (BP 1 Location: Right arm, BP Patient Position: Sitting) (!) 59 95.5 °F (35.3 °C) (Oral) 16 5' 5\" (1.651 m) 208 lb 12.8 oz (94.7 kg) SpO2 BMI OB Status Smoking Status 96% 34.75 kg/m2 Postmenopausal Former Smoker BMI and BSA Data Body Mass Index Body Surface Area 34.75 kg/m 2 2.08 m 2 Preferred Pharmacy Pharmacy Name Phone [facility] [address] [place] [phone] Your Updated Medication List  
  
   
This list is accurate as of: 12/15/17  2:10 PM.  Always use your most recent med list. amLODIPine 10 mg tablet Commonly known as:  Quinn Rodríguez Take 1 tablet every day by oral route in the morning for 90 days. atorvastatin 20 mg tablet Commonly known as:  LIPITOR  
atorvastatin 20 mg tablet  
  
 dorzolamide-timolol 22.3-6.8 mg/mL ophthalmic solution Commonly known as:  COSOPT  
  
 hydroCHLOROthiazide 25 mg tablet Commonly known as:  HYDRODIURIL Take 1 tablet every day by oral route in the morning for 90 days. latanoprost 0.005 % ophthalmic solution Commonly known as:  XALATAN  
latanoprost 0.005 % eye drops  
  
 levothyroxine 50 mcg tablet Commonly known as:  SYNTHROID Take 1 Tab by mouth Daily (before breakfast). Stop Tapazole Follow-up Instructions Return in about 6 months (around 6/15/2018). Patient Instructions STOP     TAPAZOLE Synthroid 50 mcg  A day, on empty stomach with water only, no other meds or food or drinks   For next half hour Take any kind of vitamins, calcium, iron   Pills  4 hours later Introducing [facility]! Arlette Turner introduces [employer] patient portal. Now you can access parts of your medical record, email your doctor's office, and request medication refills online. 1. In your internet browser, go to [email or web address] 2. Click on the First Time User? Click Here link in the Sign In box. You will see the New Member Sign Up page. 3. Enter your [employer] Access Code exactly as it appears below. You will not need to use this code after youve completed the sign-up process. If you do not sign up before the expiration date, you must request a new code. · [employer] Access Code: [email or web address] Expires: 3/15/2018  2:10 PM 
 
4. Enter the last four digits of your Social Security Number (xxxx) and Date of Birth (mm/dd/yyyy) as indicated and click Submit. You will be taken to the next sign-up page. 5. Create a [employer] ID. This will be your [employer] login ID and cannot be changed, so think of one that is secure and easy to remember. 6. Create a [employer] password. You can change your password at any time. 7. Enter your Password Reset Question and Answer. This can be used at a later time if you forget your password. 8. Enter your e-mail address. You will receive e-mail notification when new information is available in [address] 9. Click Sign Up. You can now view and download portions of your medical record. 10. Click the Download Summary menu link to download a portable copy of your medical information. If you have questions, please visit the Frequently Asked Questions section of the [facility] website. Remember, [facility] is NOT to be used for urgent needs. For medical emergencies, dial 911. Now available from your iPhone and Android! Please provide this summary of care documentation to your next provider. Your primary care clinician is listed as Corinne Weldon. If you have any questions after today's visit, please call [phone].

## 2018-02-07 ENCOUNTER — OP HISTORICAL/CONVERTED ENCOUNTER (OUTPATIENT)
Dept: OTHER | Age: 78
End: 2018-02-07

## 2018-04-08 RX ORDER — LEVOTHYROXINE SODIUM 50 UG/1
TABLET ORAL
Qty: 30 TAB | Refills: 6 | Status: SHIPPED | OUTPATIENT
Start: 2018-04-08 | End: 2018-06-15 | Stop reason: SDUPTHER

## 2018-06-08 ENCOUNTER — LAB ONLY (OUTPATIENT)
Dept: ENDOCRINOLOGY | Age: 78
End: 2018-06-08

## 2018-06-08 ENCOUNTER — HOSPITAL ENCOUNTER (OUTPATIENT)
Dept: LAB | Age: 78
Discharge: HOME | End: 2018-06-08
Payer: MEDICARE

## 2018-06-08 DIAGNOSIS — E03.4 HYPOTHYROIDISM DUE TO ACQUIRED ATROPHY OF THYROID: ICD-10-CM

## 2018-06-08 PROCEDURE — 85025 COMPLETE CBC W/AUTO DIFF WBC: CPT

## 2018-06-08 PROCEDURE — 84443 ASSAY THYROID STIM HORMONE: CPT

## 2018-06-08 PROCEDURE — 84439 ASSAY OF FREE THYROXINE: CPT

## 2018-06-09 LAB
BASOPHILS # BLD AUTO: 0 X10E3/UL (ref 0–0.2)
BASOPHILS NFR BLD AUTO: 1 %
EOSINOPHIL # BLD AUTO: 0.4 X10E3/UL (ref 0–0.4)
EOSINOPHIL NFR BLD AUTO: 9 %
ERYTHROCYTE [DISTWIDTH] IN BLOOD BY AUTOMATED COUNT: 15.1 % (ref 12.3–15.4)
HCT VFR BLD AUTO: 30.2 % (ref 34–46.6)
HGB BLD-MCNC: 9.4 G/DL (ref 11.1–15.9)
IMM GRANULOCYTES # BLD: 0 X10E3/UL (ref 0–0.1)
IMM GRANULOCYTES NFR BLD: 0 %
LYMPHOCYTES # BLD AUTO: 2.2 X10E3/UL (ref 0.7–3.1)
LYMPHOCYTES NFR BLD AUTO: 50 %
MCH RBC QN AUTO: 26.4 PG (ref 26.6–33)
MCHC RBC AUTO-ENTMCNC: 31.1 G/DL (ref 31.5–35.7)
MCV RBC AUTO: 85 FL (ref 79–97)
MONOCYTES # BLD AUTO: 0.4 X10E3/UL (ref 0.1–0.9)
MONOCYTES NFR BLD AUTO: 10 %
NEUTROPHILS # BLD AUTO: 1.3 X10E3/UL (ref 1.4–7)
NEUTROPHILS NFR BLD AUTO: 30 %
PLATELET # BLD AUTO: 349 X10E3/UL (ref 150–379)
RBC # BLD AUTO: 3.56 X10E6/UL (ref 3.77–5.28)
T4 FREE SERPL-MCNC: 1.63 NG/DL (ref 0.82–1.77)
TSH SERPL DL<=0.005 MIU/L-ACNC: 1.91 UIU/ML (ref 0.45–4.5)
WBC # BLD AUTO: 4.3 X10E3/UL (ref 3.4–10.8)

## 2018-06-15 ENCOUNTER — OFFICE VISIT (OUTPATIENT)
Dept: ENDOCRINOLOGY | Age: 78
End: 2018-06-15

## 2018-06-15 VITALS
SYSTOLIC BLOOD PRESSURE: 119 MMHG | TEMPERATURE: 96.4 F | WEIGHT: 205.7 LBS | BODY MASS INDEX: 34.27 KG/M2 | HEIGHT: 65 IN | OXYGEN SATURATION: 97 % | RESPIRATION RATE: 18 BRPM | HEART RATE: 66 BPM | DIASTOLIC BLOOD PRESSURE: 77 MMHG

## 2018-06-15 DIAGNOSIS — E03.2 HYPOTHYROIDISM DUE TO MEDICATION: ICD-10-CM

## 2018-06-15 DIAGNOSIS — D64.9 ANEMIA, UNSPECIFIED TYPE: ICD-10-CM

## 2018-06-15 DIAGNOSIS — E03.4 HYPOTHYROIDISM DUE TO ACQUIRED ATROPHY OF THYROID: Primary | ICD-10-CM

## 2018-06-15 DIAGNOSIS — E05.90 HYPERTHYROIDISM: ICD-10-CM

## 2018-06-15 RX ORDER — LEVOTHYROXINE SODIUM 50 UG/1
50 TABLET ORAL
Qty: 30 TAB | Refills: 6 | Status: SHIPPED | OUTPATIENT
Start: 2018-06-15 | End: 2019-06-19 | Stop reason: SDUPTHER

## 2018-06-15 NOTE — PATIENT INSTRUCTIONS
--------------------------------------------------------------------------------------------    Refills    -    please call your pharmacy and have them send us a refill request    Results  -  allow up to a week for lab results to be processed and reviewed. Phone calls  -  Allow upto 24 hrs.  for non-urgent calls to be retained    Prior authorization - It may take up to 2 weeks to process, depending on your insurance    Forms  -  FMLA, DMV, patient assistance, etc. will take up to 2 weeks to process    Cancellations - please notify the office in advance if you cannot keep your appointment    Samples  - will only be dispensed at visits as supply is limited      If you are having a medical emergency call 911    --------------------------------------------------------------------------------------------      STOP     TAPAZOLE     Synthroid 50 mcg  A day, on empty stomach with water only, no other meds or food or drinks   For next half hour     Take any kind of vitamins, calcium, iron   Pills  4 hours later

## 2018-06-15 NOTE — PROGRESS NOTES
Wt Readings from Last 3 Encounters:   06/15/18 205 lb 11.2 oz (93.3 kg)   12/15/17 208 lb 12.8 oz (94.7 kg)   09/08/17 208 lb (94.3 kg)     Temp Readings from Last 3 Encounters:   06/15/18 96.4 °F (35.8 °C) (Oral)   12/15/17 95.5 °F (35.3 °C) (Oral)   09/08/17 97.8 °F (36.6 °C) (Oral)     BP Readings from Last 3 Encounters:   06/15/18 119/77   12/15/17 139/71   09/08/17 123/67     Pulse Readings from Last 3 Encounters:   06/15/18 66   12/15/17 (!) 59   09/08/17 (!) 51

## 2018-06-15 NOTE — PROGRESS NOTES
HISTORY OF PRESENT ILLNESS  Lisset Conner is a 65 y.o. female. HPI     F/u after last visit for hypothyroidism  from Dec  2017      She stopped statin 3 weeks ago  Per my advise   She has not felt any difference     Compliant with synthroid           Old history     Very pleasant lady     Pt changed to hypothyroidism from hyperthyroidism   She is asymptomatic       Some confusion occured at pharmacy   She caught it early on         Past Medical History:   Diagnosis Date    Glaucoma     right eye    HTN (hypertension)     Hyperlipidemia     Thyroid condition        Social History     Social History    Marital status: SINGLE     Spouse name: N/A    Number of children: N/A    Years of education: N/A     Occupational History    Not on file. Social History Main Topics    Smoking status: Former Smoker    Smokeless tobacco: Never Used    Alcohol use No    Drug use: No    Sexual activity: Not on file     Other Topics Concern    Not on file     Social History Narrative       History reviewed. No pertinent family history. Review of Systems   Constitutional: Negative. HENT: Negative. Eyes: Negative for pain and redness. Respiratory: Negative. Cardiovascular: Negative for chest pain, palpitations and leg swelling. Gastrointestinal: Negative. Negative for constipation. Genitourinary: Negative. Musculoskeletal: positive for  myalgias. Skin: Negative. Neurological: Negative. Endo/Heme/Allergies: Negative. Psychiatric/Behavioral: Negative for depression and memory loss. The patient does not have insomnia. Physical Exam   Constitutional: She is oriented to person, place, and time. She appears well-developed and well-nourished. HENT:   Head: Normocephalic. Eyes: Conjunctivae and EOM are normal. Pupils are equal, round, and reactive to light. Neck: Normal range of motion. Neck supple. No JVD present. No tracheal deviation present. Thyromegaly present. Cardiovascular: Normal rate, regular rhythm and normal heart sounds. No murmur heard. Pulmonary/Chest: Breath sounds normal.   Abdominal: Soft. Bowel sounds are normal.   Musculoskeletal: Normal range of motion. Lymphadenopathy:     She has no cervical adenopathy. Neurological: She is alert and oriented to person, place, and time. She has normal reflexes. Skin: Skin is warm. Psychiatric: She has a normal mood and affect.       usg from date oct 18 2016 :  Ist is thick at 6 mm and has 0.9 cm nodule   3 left lobe nodules, the superior one is 1.2 cm, middle 3.3 cm and inferior one 1.1 cm       The scan showed diffuse uptake of 40 %         Lab Results   Component Value Date/Time    TSH 1.910 06/08/2018 12:00 AM    T4, Free 1.63 06/08/2018 12:00 AM          ASSESSMENT and PLAN      Hypothyroidism : Euthyroid   On  synthroid 50 mcg a day  Educated on pill intake       Hyperthyroidism :  Resolved   Thyrodi uptake and scan favoring Graves disease / Hashitoxicosis   Started  on tapazole 10 mg bid, after last visit  labs confirming hyperthyroidism , later increased to 15 mg bid   Then in the interim, reduced dose to one and half pills a day in may 2017   Pt continued on it 15 mg at bid without changing it   Became hypothyroid and had to STOP TAPAZOLE       2. Multiple  Thyroid  nodules, on left lobe   S/p FNA of dominant nodule  Left side --  Got incomplete sample, but the cells drawn were Benign, reassured the pt  Jan 2017   Not considering to repeat       3. Anemia :  HGB is below 10   To f/u with her pcp     4.   Pt remembers that I told her to stop STATIN - lipitor 20 mg hs   I do not remember the exact reason but for her age over 75 yrs, there is no strong evidence to support statin use     Pt has not felt any difference in pains since stopping statin, so left to her choice   I do not have lipid profile  to advise her           > 50 % of time is spent on counseling   Patient voiced understanding her plan of care

## 2018-06-15 NOTE — MR AVS SNAPSHOT
[address] [id number] 
[phone] Patient: Yvonne Avalos MRN: [id number] :1940 Visit Information Date & Time Provider Department Dept. Phone Encounter #  
 6/15/2018  1:30 PM Pema Arshad MD [facility] [phone] [id number] Follow-up Instructions Return in about 1 year (around 6/15/2019). Upcoming Health Maintenance Date Due DTaP/Tdap/Td series (1 - Tdap) 10/1/1960 ZOSTER VACCINE AGE 60> 1999 GLAUCOMA SCREENING Q2Y 10/1/2004 Bone Densitometry (Dexa) Screening 10/1/2004 Pneumococcal 65+ Low/Medium Risk (1 of 2 - PCV13) 10/1/2004 MEDICARE YEARLY EXAM 3/14/2018 Influenza Age 5 to Adult 2018 Allergies as of 6/15/2018  Review Complete On: 6/15/2018 By: Pema Arshad MD  
 No Known Allergies Current Immunizations  Never Reviewed No immunizations on file. Not reviewed this visit You Were Diagnosed With   
  
 Codes Comments Hypothyroidism due to acquired atrophy of thyroid    -  Primary ICD-10-CM: E03.4 ICD-9-CM: 244.8, 246.8 Anemia, unspecified type     ICD-10-CM: D64.9 ICD-9-CM: 144. 9 Vitals BP Pulse Temp Resp Height(growth percentile) Weight(growth percentile) 119/77 (BP 1 Location: Right arm, BP Patient Position: Sitting) 66 96.4 °F (35.8 °C) (Oral) 18 5' 5\" (1.651 m) 205 lb 11.2 oz (93.3 kg) SpO2 BMI OB Status Smoking Status 97% 34.23 kg/m2 Postmenopausal Former Smoker Vitals History BMI and BSA Data Body Mass Index Body Surface Area  
 34.23 kg/m 2 2.07 m 2 Preferred Pharmacy Pharmacy Name Phone [id number] [address] [phone] Your Updated Medication List  
  
   
This list is accurate as of 6/15/18  2:07 PM.  Always use your most recent med list. amLODIPine 10 mg tablet Commonly known as:  Simon De Los Santos Take 1 tablet every day by oral route in the morning for 90 days. atorvastatin 20 mg tablet Commonly known as:  LIPITOR  
atorvastatin 20 mg tablet  
  
 dorzolamide-timolol 22.3-6.8 mg/mL ophthalmic solution Commonly known as:  COSOPT  
  
 hydroCHLOROthiazide 25 mg tablet Commonly known as:  HYDRODIURIL Take 1 tablet every day by oral route in the morning for 90 days. latanoprost 0.005 % ophthalmic solution Commonly known as:  XALATAN  
latanoprost 0.005 % eye drops  
  
 levothyroxine 50 mcg tablet Commonly known as:  SYNTHROID Take 1 Tab by mouth Daily (before breakfast). Stop Tapazole Follow-up Instructions Return in about 1 year (around 6/15/2019). Patient Instructions   
-------------------------------------------------------------------------------------------- Refills    -    please call your pharmacy and have them send us a refill request 
 
Results  -  allow up to a week for lab results to be processed and reviewed. Phone calls  -  Allow upto 24 hrs. for non-urgent calls to be retained Prior authorization - It may take up to 2 weeks to process, depending on your insurance Forms  -  FMLA, DMV, patient assistance, etc. will take up to 2 weeks to process Cancellations - please notify the office in advance if you cannot keep your appointment Samples  - will only be dispensed at visits as supply is limited If you are having a medical emergency call 911 
 
-------------------------------------------------------------------------------------------- 
 
 
STOP     TAPAZOLE Synthroid 50 mcg  A day, on empty stomach with water only, no other meds or food or drinks   For next half hour Take any kind of vitamins, calcium, iron   Pills  4 hours later Introducing [facility]!    
 [employer] introduces [employer] patient portal. Now you can access parts of your medical record, email your doctor's office, and request medication refills online. 1. In your internet browser, go to [email or web address]. [employer] 2. Click on the First Time User? Click Here link in the Sign In box. You will see the New Member Sign Up page. 3. Enter your Sezion Access Code exactly as it appears below. You will not need to use this code after youve completed the sign-up process. If you do not sign up before the expiration date, you must request a new code. · Sezion Access Code: [email or web address] Expires: 9/13/2018  2:07 PM 
 
4. Enter the last four digits of your Social Security Number (xxxx) and Date of Birth (mm/dd/yyyy) as indicated and click Submit. You will be taken to the next sign-up page. 5. Create a Sezion ID. This will be your Sezion login ID and cannot be changed, so think of one that is secure and easy to remember. 6. Create a Sezion password. You can change your password at any time. 7. Enter your Password Reset Question and Answer. This can be used at a later time if you forget your password. 8. Enter your e-mail address. You will receive e-mail notification when new information is available in [address] 9. Click Sign Up. You can now view and download portions of your medical record. 10. Click the Download Summary menu link to download a portable copy of your medical information. If you have questions, please visit the Frequently Asked Questions section of the Sezion website. Remember, Se[employer]ion is NOT to be used for urgent needs. For medical emergencies, dial 911. Now available from your iPhone and Android! Please provide this summary of care documentation to your next provider. Your primary care clinician is listed as Corey Carroll. If you have any questions after today's visit, please call [phone].

## 2018-10-25 ENCOUNTER — OP HISTORICAL/CONVERTED ENCOUNTER (OUTPATIENT)
Dept: OTHER | Age: 78
End: 2018-10-25

## 2018-12-03 ENCOUNTER — OP HISTORICAL/CONVERTED ENCOUNTER (OUTPATIENT)
Dept: OTHER | Age: 78
End: 2018-12-03

## 2019-01-04 ENCOUNTER — OP HISTORICAL/CONVERTED ENCOUNTER (OUTPATIENT)
Dept: OTHER | Age: 79
End: 2019-01-04

## 2019-06-12 ENCOUNTER — HOSPITAL ENCOUNTER (OUTPATIENT)
Dept: LAB | Age: 79
Discharge: HOME | End: 2019-06-12
Payer: MEDICARE

## 2019-06-12 DIAGNOSIS — D64.9 ANEMIA, UNSPECIFIED TYPE: ICD-10-CM

## 2019-06-12 DIAGNOSIS — E03.4 HYPOTHYROIDISM DUE TO ACQUIRED ATROPHY OF THYROID: ICD-10-CM

## 2019-06-12 PROCEDURE — 36415 COLL VENOUS BLD VENIPUNCTURE: CPT

## 2019-06-12 PROCEDURE — 85025 COMPLETE CBC W/AUTO DIFF WBC: CPT

## 2019-06-12 PROCEDURE — 84443 ASSAY THYROID STIM HORMONE: CPT

## 2019-06-12 PROCEDURE — 84439 ASSAY OF FREE THYROXINE: CPT

## 2019-06-13 LAB
BASOPHILS # BLD AUTO: 0 X10E3/UL (ref 0–0.2)
BASOPHILS NFR BLD AUTO: 0 %
EOSINOPHIL # BLD AUTO: 0.4 X10E3/UL (ref 0–0.4)
EOSINOPHIL NFR BLD AUTO: 8 %
ERYTHROCYTE [DISTWIDTH] IN BLOOD BY AUTOMATED COUNT: 15.3 % (ref 12.3–15.4)
HCT VFR BLD AUTO: 36.1 % (ref 34–46.6)
HGB BLD-MCNC: 12 G/DL (ref 11.1–15.9)
IMM GRANULOCYTES # BLD AUTO: 0 X10E3/UL (ref 0–0.1)
IMM GRANULOCYTES NFR BLD AUTO: 0 %
LYMPHOCYTES # BLD AUTO: 2 X10E3/UL (ref 0.7–3.1)
LYMPHOCYTES NFR BLD AUTO: 44 %
MCH RBC QN AUTO: 31.9 PG (ref 26.6–33)
MCHC RBC AUTO-ENTMCNC: 33.2 G/DL (ref 31.5–35.7)
MCV RBC AUTO: 96 FL (ref 79–97)
MONOCYTES # BLD AUTO: 0.4 X10E3/UL (ref 0.1–0.9)
MONOCYTES NFR BLD AUTO: 9 %
NEUTROPHILS # BLD AUTO: 1.8 X10E3/UL (ref 1.4–7)
NEUTROPHILS NFR BLD AUTO: 39 %
PLATELET # BLD AUTO: 302 X10E3/UL (ref 150–450)
RBC # BLD AUTO: 3.76 X10E6/UL (ref 3.77–5.28)
T4 FREE SERPL-MCNC: 1.62 NG/DL (ref 0.82–1.77)
TSH SERPL DL<=0.005 MIU/L-ACNC: 1.71 UIU/ML (ref 0.45–4.5)
WBC # BLD AUTO: 4.6 X10E3/UL (ref 3.4–10.8)

## 2019-06-19 ENCOUNTER — OFFICE VISIT (OUTPATIENT)
Dept: ENDOCRINOLOGY | Age: 79
End: 2019-06-19

## 2019-06-19 VITALS
SYSTOLIC BLOOD PRESSURE: 123 MMHG | DIASTOLIC BLOOD PRESSURE: 67 MMHG | BODY MASS INDEX: 33.61 KG/M2 | HEART RATE: 60 BPM | OXYGEN SATURATION: 96 % | TEMPERATURE: 97.1 F | HEIGHT: 65 IN | RESPIRATION RATE: 18 BRPM | WEIGHT: 201.7 LBS

## 2019-06-19 DIAGNOSIS — E03.2 HYPOTHYROIDISM DUE TO MEDICATION: ICD-10-CM

## 2019-06-19 DIAGNOSIS — E78.2 MIXED HYPERLIPIDEMIA: ICD-10-CM

## 2019-06-19 DIAGNOSIS — E04.2 MULTINODULAR GOITER: Primary | ICD-10-CM

## 2019-06-19 RX ORDER — LEVOTHYROXINE SODIUM 50 UG/1
50 TABLET ORAL
Qty: 90 TAB | Refills: 4 | Status: SHIPPED | OUTPATIENT
Start: 2019-06-19 | End: 2020-06-18 | Stop reason: SDUPTHER

## 2019-06-19 NOTE — LETTER
6/19/19 Patient: Melinda Farrell YOB: 1940 Date of Visit: 6/19/2019 Kayce Ren DO 
[address] [facility] [address] [place] [address] VIA Facsimile: [phone] Dear Kayce Ren DO, Thank you for referring Ms. Melinda Farrell to [id number] [place] [address] for evaluation. My notes for this consultation are attached. If you have questions, please do not hesitate to call me. I look forward to following your patient along with you. Sincerely, John Figueroa MD

## 2019-06-19 NOTE — PROGRESS NOTES
1. Have you been to the ER, urgent care clinic since your last visit? No  Hospitalized since your last visit? No    2. Have you seen or consulted any other health care providers outside of the [address] [place] [address] since your last visit? Include any pap smears or colon screening.  No    Wt Readings from Last 3 Encounters:   06/19/19 201 lb 11.2 oz (91.5 kg)   06/15/18 205 lb 11.2 oz (93.3 kg)   12/15/17 208 lb 12.8 oz (94.7 kg)     Temp Readings from Last 3 Encounters:   06/19/19 97.1 °F (36.2 °C) (Oral)   06/15/18 96.4 °F (35.8 °C) (Oral)   12/15/17 95.5 °F (35.3 °C) (Oral)     BP Readings from Last 3 Encounters:   06/19/19 123/67   06/15/18 119/77   12/15/17 139/71     Pulse Readings from Last 3 Encounters:   06/19/19 60   06/15/18 66   12/15/17 (!) 59

## 2019-06-19 NOTE — PATIENT INSTRUCTIONS
-------------------------------------------------------------------------------------------- Refills    -    please call your pharmacy and have them send us a refill request 
 
Results  -  allow up to a week for lab results to be processed and reviewed. Phone calls  -  Allow upto 24 hrs. for non-urgent calls to be retained Prior authorization - It may take up to 2 weeks to process, depending on your insurance Forms  -  FMLA, DMV, patient assistance, etc. will take up to 2 weeks to process Cancellations - please notify the office in advance if you cannot keep your appointment Samples  - will only be dispensed at visits as supply is limited If you are having a medical emergency call 911 
 
-------------------------------------------------------------------------------------------- Synthroid 50 mcg  A day, on empty stomach with water only, no other meds or food or drinks   For next half hour Take any kind of vitamins, calcium, iron   Pills  4 hours later

## 2019-06-19 NOTE — PROGRESS NOTES
HISTORY OF PRESENT ILLNESS  Luis Patel is a 77 y.o. female.   HPI     F/u after last visit for hypothyroidism  from June 2018     In the interim , pt had seen hematologist   Had transfusions twice     Compliant  With synthroid             Old history  :   She stopped statin 3 weeks ago  Per my advise   She has not felt any difference   Compliant with synthroid           Old history     Very pleasant lady     Pt changed to hypothyroidism from hyperthyroidism   She is asymptomatic       Some confusion occured at pharmacy   She caught it early on         Past Medical History:   Diagnosis Date    Glaucoma     right eye    HTN (hypertension)     Hyperlipidemia     Thyroid condition        Social History     Socioeconomic History    Marital status: SINGLE     Spouse name: Not on file    Number of children: Not on file    Years of education: Not on file    Highest education level: Not on file   Occupational History    Not on file   Social Needs    Financial resource strain: Not on file    Food insecurity:     Worry: Not on file     Inability: Not on file    Transportation needs:     Medical: Not on file     Non-medical: Not on file   Tobacco Use    Smoking status: Former Smoker    Smokeless tobacco: Never Used   Substance and Sexual Activity    Alcohol use: No    Drug use: No    Sexual activity: Not on file   Lifestyle    Physical activity:     Days per week: Not on file     Minutes per session: Not on file    Stress: Not on file   Relationships    Social connections:     Talks on phone: Not on file     Gets together: Not on file     Attends [employer] service: Not on file     Active member of club or organization: Not on file     Attends meetings of clubs or organizations: Not on file     Relationship status: Not on file    Intimate partner violence:     Fear of current or ex partner: Not on file     Emotionally abused: Not on file     Physically abused: Not on file     Forced sexual activity: Not on file   Other Topics Concern    Not on file   Social History Narrative    Not on file       History reviewed. No pertinent family history. Review of Systems   Constitutional: Negative. HENT: Negative. Eyes: Negative for pain and redness. Respiratory: Negative. Cardiovascular: Negative for chest pain, palpitations and leg swelling. Gastrointestinal: Negative. Negative for constipation. Genitourinary: Negative. Musculoskeletal: positive for  myalgias. Skin: Negative. Neurological: Negative. Endo/Heme/Allergies: Negative. Psychiatric/Behavioral: Negative for depression and memory loss. The patient does not have insomnia. Physical Exam   Constitutional: She is oriented to person, place, and time. She appears well-developed and well-nourished. HENT:   Head: Normocephalic. Eyes: Conjunctivae and EOM are normal. Pupils are equal, round, and reactive to light. Neck: Normal range of motion. Neck supple. No JVD present. No tracheal deviation present. Thyromegaly present. Cardiovascular: Normal rate, regular rhythm and normal heart sounds. No murmur heard. Pulmonary/Chest: Breath sounds normal.   Abdominal: Soft. Bowel sounds are normal.   Musculoskeletal: Normal range of motion. Lymphadenopathy:     She has no cervical adenopathy. Neurological: She is alert and oriented to person, place, and time. She has normal reflexes. Skin: Skin is warm.    Psychiatric: She has a normal mood and affect.       usg from date oct 18 2016 :  Ist is thick at 6 mm and has 0.9 cm nodule   3 left lobe nodules, the superior one is 1.2 cm, middle 3.3 cm and inferior one 1.1 cm       The scan showed diffuse uptake of 40 %         Lab Results   Component Value Date/Time    TSH 1.710 06/12/2019 01:40 PM    T4, Free 1.62 06/12/2019 01:40 PM          ASSESSMENT and PLAN      Hypothyroidism : Euthyroid   On  synthroid 50 mcg a day  Educated on pill intake       Hyperthyroidism :  Resolved Thyrodi uptake and scan favoring Graves disease / Hashitoxicosis   Started  on tapazole 10 mg bid, after last visit  labs confirming hyperthyroidism , later increased to 15 mg bid   Then in the interim, reduced dose to one and half pills a day in may 2017   Pt continued on it 15 mg at bid without changing it   Became hypothyroid and had to STOP TAPAZOLE       2. Multiple  Thyroid  nodules, on left lobe   S/p FNA of dominant nodule  Left side jan 2017  --  Got incomplete sample, but the cells drawn were Benign, reassured   Not considering to repeat right away     Scheduling patient for FNA in office on left  in jan 2020     3. Anemia :  Resolved   She  f/u with her pcp and he referred pt to hematologist   Pt underwent transfusions     4.   Pt remembers that I told her to stop STATIN - lipitor 20 mg hs   I do not remember the exact reason but for her age over 75 yrs, there is no strong evidence to support statin use     Pt has not felt any difference in pains since stopping statin, so left to her choice   I do not have lipid profile  to advise her           > 50 % of time is spent on counseling   Patient voiced understanding her plan of care

## 2019-10-25 ENCOUNTER — OP HISTORICAL/CONVERTED ENCOUNTER (OUTPATIENT)
Dept: OTHER | Age: 79
End: 2019-10-25

## 2020-01-22 ENCOUNTER — HOSPITAL ENCOUNTER (OUTPATIENT)
Dept: LAB | Age: 80
Discharge: HOME | End: 2020-01-22

## 2020-01-22 ENCOUNTER — OFFICE VISIT (OUTPATIENT)
Dept: ENDOCRINOLOGY | Age: 80
End: 2020-01-22

## 2020-01-22 VITALS
WEIGHT: 211 LBS | DIASTOLIC BLOOD PRESSURE: 64 MMHG | HEART RATE: 66 BPM | OXYGEN SATURATION: 96 % | BODY MASS INDEX: 35.16 KG/M2 | SYSTOLIC BLOOD PRESSURE: 126 MMHG | HEIGHT: 65 IN | TEMPERATURE: 95.4 F | RESPIRATION RATE: 18 BRPM

## 2020-01-22 DIAGNOSIS — E04.2 MULTINODULAR GOITER: ICD-10-CM

## 2020-01-22 DIAGNOSIS — E05.90 HYPERTHYROIDISM: ICD-10-CM

## 2020-01-22 DIAGNOSIS — E04.2 MULTINODULAR GOITER: Primary | ICD-10-CM

## 2020-01-22 DIAGNOSIS — E03.2 HYPOTHYROIDISM DUE TO MEDICATION: ICD-10-CM

## 2020-01-22 LAB — TSH SERPL DL<=0.05 MIU/L-ACNC: 2.02 UIU/ML (ref 0.36–3.74)

## 2020-01-22 RX ORDER — LANOLIN ALCOHOL/MO/W.PET/CERES
1000 CREAM (GRAM) TOPICAL DAILY
COMMUNITY
End: 2021-06-20 | Stop reason: ALTCHOICE

## 2020-01-22 RX ORDER — FOLIC ACID 1 MG/1
TABLET ORAL DAILY
COMMUNITY
End: 2021-06-20 | Stop reason: ALTCHOICE

## 2020-01-22 NOTE — PROGRESS NOTES
[facility]  OFFICE PROCEDURE PROGRESS NOTE        Chart reviewed for the following:   Maliha Hewitt MD, have reviewed the History, Physical and updated the Allergic reactions for Roxy Felipe performed immediately prior to start of procedure:   Maliha Hewitt MD, have performed the following reviews on Meghna Valverde prior to the start of the procedure:            * Patient was identified by name and date of birth   * Agreement on procedure being performed was verified  * Risks and Benefits explained to the patient  * Procedure site verified and marked as necessary  * Patient was positioned for comfort  * Consent was signed and verified     Time: 120 pm    Pain scale : 0    Date of procedure: 1/22/2020    Procedure performed by:  Niurka Reece MD    Provider assisted by:  Jaqui Ingram LPN    Real time imaging was performed in both transverse and sagittal planes    Nodule left calcified nodule, repeat biopsy   Following informed consent, a procedural pause was held to confirm patiient identity and site of biopsy. After sterile preparation, FNA guidance was performed using direct ultrasound guidance to confirm accurate needle placement. 4 aspirations were made using 25 G needles  Samples were submitted to cytology  Patient  tolerated procedure well without complications  After care instructions provided.       Pain scale post procedure : 4

## 2020-01-22 NOTE — LETTER
1/22/20 Patient: Susanne Batista YOB: 1940 Date of Visit: 1/22/2020 Zulma Nuñez DO 
[address] [facility] [address] [facility] [address] VIA Facsimile: [phone] Dear Zulma Nuñez DO, Thank you for referring Ms. Susanne Batista to [id number] [place] [address] for evaluation. My notes for this consultation are attached. If you have questions, please do not hesitate to call me. I look forward to following your patient along with you. Sincerely, Lamar Araujo MD

## 2020-01-22 NOTE — PROGRESS NOTES
1. Have you been to the ER, urgent care clinic since your last visit? No  Hospitalized since your last visit? No    2. Have you seen or consulted any other health care providers outside of the [address] [place] [address] since your last visit? Include any pap smears or colon screening.  No    Wt Readings from Last 3 Encounters:   01/22/20 211 lb (95.7 kg)   06/19/19 201 lb 11.2 oz (91.5 kg)   06/15/18 205 lb 11.2 oz (93.3 kg)     Temp Readings from Last 3 Encounters:   01/22/20 95.4 °F (35.2 °C) (Oral)   06/19/19 97.1 °F (36.2 °C) (Oral)   06/15/18 96.4 °F (35.8 °C) (Oral)     BP Readings from Last 3 Encounters:   01/22/20 126/64   06/19/19 123/67   06/15/18 119/77     Pulse Readings from Last 3 Encounters:   01/22/20 66   06/19/19 60   06/15/18 66

## 2020-01-22 NOTE — PATIENT INSTRUCTIONS
Please take tylenol 500 mg or Motrin 400 mg (2 pills of 200 mg dose) OTC,  if there is any biopsy site pain You can go back to your normal routine immediately If you notice any dime sized redness, at the biopsy site, it is normal and do not worry If you have more symptoms and signs requiring emergency assistance,  call 911   or go to Emergency room  
 
-------------------------------------------------------------------------------------------------------------- Synthroid 50 mcg  A day, on empty stomach with water only, no other meds or food or drinks   For next half hour Take any kind of vitamins, calcium, iron   Pills  4 hours later

## 2020-01-28 NOTE — PROGRESS NOTES
Two pt identifiers confirmed. Informed pt per Dr. Joby Sims that it is negative for cancer. Pt verbalized understanding of information discussed w/ no further questions at this time.

## 2020-05-21 ENCOUNTER — TELEPHONE (OUTPATIENT)
Dept: ENDOCRINOLOGY | Age: 80
End: 2020-05-21

## 2020-05-21 DIAGNOSIS — E03.2 HYPOTHYROIDISM DUE TO MEDICATION: Primary | ICD-10-CM

## 2020-05-21 DIAGNOSIS — E78.2 MIXED HYPERLIPIDEMIA: ICD-10-CM

## 2020-05-21 DIAGNOSIS — E03.2 HYPOTHYROIDISM DUE TO MEDICATION: ICD-10-CM

## 2020-05-21 NOTE — TELEPHONE ENCOUNTER
Order placed for pt per verbal order with read back from Dr. Demond Jimenes 05/21/20    Lab slips mailed

## 2020-06-10 ENCOUNTER — HOSPITAL ENCOUNTER (OUTPATIENT)
Dept: LAB | Age: 80
Discharge: HOME | End: 2020-06-10
Payer: MEDICARE

## 2020-06-10 PROCEDURE — 36415 COLL VENOUS BLD VENIPUNCTURE: CPT

## 2020-06-10 PROCEDURE — 84439 ASSAY OF FREE THYROXINE: CPT

## 2020-06-10 PROCEDURE — 84443 ASSAY THYROID STIM HORMONE: CPT

## 2020-06-10 PROCEDURE — 80061 LIPID PANEL: CPT

## 2020-06-11 LAB
CHOLEST SERPL-MCNC: 232 MG/DL (ref 100–199)
HDLC SERPL-MCNC: 53 MG/DL
INTERPRETATION, 910389: NORMAL
LDLC SERPL CALC-MCNC: 153 MG/DL (ref 0–99)
T4 FREE SERPL-MCNC: 1.64 NG/DL (ref 0.82–1.77)
TRIGL SERPL-MCNC: 128 MG/DL (ref 0–149)
TSH SERPL DL<=0.005 MIU/L-ACNC: 2.31 UIU/ML (ref 0.45–4.5)
VLDLC SERPL CALC-MCNC: 26 MG/DL (ref 5–40)

## 2020-06-18 ENCOUNTER — OFFICE VISIT (OUTPATIENT)
Dept: ENDOCRINOLOGY | Age: 80
End: 2020-06-18

## 2020-06-18 VITALS
WEIGHT: 208.6 LBS | RESPIRATION RATE: 20 BRPM | BODY MASS INDEX: 34.75 KG/M2 | HEART RATE: 61 BPM | OXYGEN SATURATION: 95 % | DIASTOLIC BLOOD PRESSURE: 75 MMHG | SYSTOLIC BLOOD PRESSURE: 147 MMHG | TEMPERATURE: 96.9 F | HEIGHT: 65 IN

## 2020-06-18 DIAGNOSIS — E03.2 HYPOTHYROIDISM DUE TO MEDICATION: ICD-10-CM

## 2020-06-18 DIAGNOSIS — E04.2 MULTINODULAR GOITER: Primary | ICD-10-CM

## 2020-06-18 RX ORDER — LEVOTHYROXINE SODIUM 50 UG/1
50 TABLET ORAL
Qty: 90 TAB | Refills: 4 | Status: SHIPPED | OUTPATIENT
Start: 2020-06-18 | End: 2021-06-16 | Stop reason: SDUPTHER

## 2020-06-18 NOTE — PROGRESS NOTES
1. Have you been to the ER, urgent care clinic since your last visit? No  Hospitalized since your last visit? No    2. Have you seen or consulted any other health care providers outside of the [address] [place] [address] since your last visit? Include any pap smears or colon screening.  No    Wt Readings from Last 3 Encounters:   06/18/20 208 lb 9.6 oz (94.6 kg)   01/22/20 211 lb (95.7 kg)   06/19/19 201 lb 11.2 oz (91.5 kg)     Temp Readings from Last 3 Encounters:   06/18/20 96.9 °F (36.1 °C) (Oral)   01/22/20 95.4 °F (35.2 °C) (Oral)   06/19/19 97.1 °F (36.2 °C) (Oral)     BP Readings from Last 3 Encounters:   06/18/20 147/75   01/22/20 126/64   06/19/19 123/67     Pulse Readings from Last 3 Encounters:   06/18/20 61   01/22/20 66   06/19/19 60

## 2020-06-18 NOTE — LETTER
6/21/20 Patient: Abdirahman Andrade YOB: 1940 Date of Visit: 6/18/2020 Rito Sousa MD 
[address] [facility] [address] [facility] [address] VIA In Basket Dear Rito Sousa MD, Thank you for referring Ms. Abdirahman Andrade to [address] for evaluation. My notes for this consultation are attached. If you have questions, please do not hesitate to call me. I look forward to following your patient along with you. Sincerely, Braulio William MD

## 2020-06-18 NOTE — PROGRESS NOTES
HISTORY OF PRESENT ILLNESS  Tabitha Leong is a [de-identified] y.o. female. HPI     A year of  F/u after last visit for hypothyroidism  from Jan 2020      Pt had thyroid FNA  Done in office   Pt is compliant with synthroid           Old history  :      In the interim , pt had seen hematologist   Had transfusions twice   Compliant  With synthroid             Old history  :   She stopped statin 3 weeks ago  Per my advise   She has not felt any difference   Compliant with synthroid           Old history     Very pleasant lady     Pt changed to hypothyroidism from hyperthyroidism   She is asymptomatic   Some confusion occured at pharmacy   She caught it early on         Past Medical History:   Diagnosis Date    Glaucoma     right eye    HTN (hypertension)     Hyperlipidemia     Thyroid condition        Social History     Socioeconomic History    Marital status: SINGLE     Spouse name: Not on file    Number of children: Not on file    Years of education: Not on file    Highest education level: Not on file   Occupational History    Not on file   Social Needs    Financial resource strain: Not on file    Food insecurity     Worry: Not on file     Inability: Not on file    Transportation needs     Medical: Not on file     Non-medical: Not on file   Tobacco Use    Smoking status: Former Smoker    Smokeless tobacco: Never Used   Substance and Sexual Activity    Alcohol use: No    Drug use: No    Sexual activity: Not on file   Lifestyle    Physical activity     Days per week: Not on file     Minutes per session: Not on file    Stress: Not on file   Relationships    Social connections     Talks on phone: Not on file     Gets together: Not on file     Attends Gnosticist service: Not on file     Active member of club or organization: Not on file     Attends meetings of clubs or organizations: Not on file     Relationship status: Not on file    Intimate partner violence     Fear of current or ex partner: Not on file Emotionally abused: Not on file     Physically abused: Not on file     Forced sexual activity: Not on file   Other Topics Concern    Not on file   Social History Narrative    Not on file       History reviewed. No pertinent family history. Review of Systems   Constitutional: Negative. HENT: Negative. Eyes: Negative for pain and redness. Respiratory: Negative. Cardiovascular: Negative for chest pain, palpitations and leg swelling. Gastrointestinal: Negative. Negative for constipation. Genitourinary: Negative. Musculoskeletal: positive for  myalgias. Skin: Negative. Neurological: Negative. Endo/Heme/Allergies: Negative. Psychiatric/Behavioral: Negative for depression and memory loss. The patient does not have insomnia. Physical Exam   Constitutional: She is oriented to person, place, and time. She appears well-developed and well-nourished. HENT:   Head: Normocephalic. Eyes: Conjunctivae and EOM are normal. Pupils are equal, round, and reactive to light. Neck: Normal range of motion. Neck supple. No JVD present. No tracheal deviation present. Thyromegaly present. Cardiovascular: Normal rate, regular rhythm and normal heart sounds. No murmur heard. Pulmonary/Chest: Breath sounds normal.   Abdominal: Soft. Bowel sounds are normal.   Musculoskeletal: Normal range of motion. Lymphadenopathy:     She has no cervical adenopathy. Neurological: She is alert and oriented to person, place, and time. She has normal reflexes. Skin: Skin is warm. Psychiatric: She has a normal mood and affect.       usg from date oct 18 2016 :  Ist is thick at 6 mm and has 0.9 cm nodule   3 left lobe nodules, the superior one is 1.2 cm, middle 3.3 cm and inferior one 1.1 cm       The scan showed diffuse uptake of 40 %         Lab Results   Component Value Date/Time    TSH 2.310 06/10/2020 02:39 PM    T4, Free 1.64 06/10/2020 02:39 PM          ASSESSMENT and PLAN      1. Hypothyroidism : Euthyroid   On  synthroid 50 mcg a day    2. H/o hyperthyroidism :  Resolved   Thyrodi uptake and scan favoring Graves disease / Hashitoxicosis   Became hypothyroid and had to STOP TAPAZOLE       2. Multiple  Thyroid  nodules, on left lobe   S/p FNA of dominant nodule  Left side jan 2017  --  Got incomplete sample, but the cells drawn were Benign, reassured   Not considering to repeat right away   S/p FNA in office on left  in jan 2020 - NEGATIVE FOR CANCER         3. Anemia :  Resolved AFTER blood transfusions         4.   Pt rememberED  that I told her to stop STATIN - lipitor 20 mg hs   SHE IS  over 74 yrs, there is no strong evidence to support statin use  ESPECIALLY IN NON -DIABETICS AND WHO SO HAD NOT HAD ANY ASHD            > 50 % of time is spent on counseling   Patient voiced understanding her plan of care

## 2021-06-16 ENCOUNTER — OFFICE VISIT (OUTPATIENT)
Dept: ENDOCRINOLOGY | Age: 81
End: 2021-06-16
Payer: MEDICARE

## 2021-06-16 VITALS
SYSTOLIC BLOOD PRESSURE: 152 MMHG | OXYGEN SATURATION: 96 % | HEART RATE: 66 BPM | TEMPERATURE: 95.9 F | DIASTOLIC BLOOD PRESSURE: 81 MMHG | RESPIRATION RATE: 18 BRPM | BODY MASS INDEX: 33.22 KG/M2 | WEIGHT: 199.4 LBS | HEIGHT: 65 IN

## 2021-06-16 DIAGNOSIS — E04.2 MULTINODULAR GOITER: ICD-10-CM

## 2021-06-16 DIAGNOSIS — E03.2 HYPOTHYROIDISM DUE TO MEDICATION: Primary | ICD-10-CM

## 2021-06-16 PROCEDURE — 99214 OFFICE O/P EST MOD 30 MIN: CPT | Performed by: INTERNAL MEDICINE

## 2021-06-16 PROCEDURE — 10005 FNA BX W/US GDN 1ST LES: CPT | Performed by: INTERNAL MEDICINE

## 2021-06-16 PROCEDURE — G8427 DOCREV CUR MEDS BY ELIG CLIN: HCPCS | Performed by: INTERNAL MEDICINE

## 2021-06-16 PROCEDURE — 1090F PRES/ABSN URINE INCON ASSESS: CPT | Performed by: INTERNAL MEDICINE

## 2021-06-16 PROCEDURE — G8536 NO DOC ELDER MAL SCRN: HCPCS | Performed by: INTERNAL MEDICINE

## 2021-06-16 PROCEDURE — G8400 PT W/DXA NO RESULTS DOC: HCPCS | Performed by: INTERNAL MEDICINE

## 2021-06-16 PROCEDURE — 1101F PT FALLS ASSESS-DOCD LE1/YR: CPT | Performed by: INTERNAL MEDICINE

## 2021-06-16 PROCEDURE — G8417 CALC BMI ABV UP PARAM F/U: HCPCS | Performed by: INTERNAL MEDICINE

## 2021-06-16 PROCEDURE — G8510 SCR DEP NEG, NO PLAN REQD: HCPCS | Performed by: INTERNAL MEDICINE

## 2021-06-16 PROCEDURE — G8754 DIAS BP LESS 90: HCPCS | Performed by: INTERNAL MEDICINE

## 2021-06-16 PROCEDURE — G8753 SYS BP > OR = 140: HCPCS | Performed by: INTERNAL MEDICINE

## 2021-06-16 RX ORDER — LEVOTHYROXINE SODIUM 50 UG/1
50 TABLET ORAL
Qty: 90 TABLET | Refills: 4 | Status: SHIPPED | OUTPATIENT
Start: 2021-06-16 | End: 2022-06-14 | Stop reason: SDUPTHER

## 2021-06-16 NOTE — PROGRESS NOTES
[facility]  OFFICE PROCEDURE PROGRESS NOTE        Chart reviewed for the following:   Yoan Badillo MD, have reviewed the History, Physical and updated the Allergic reactions for Rizwana Gey performed immediately prior to start of procedure:   Yoan Badillo MD, have performed the following reviews on Lisa Kelsey prior to the start of the procedure:            * Patient was identified by name and date of birth   * Agreement on procedure being performed was verified  * Risks and Benefits explained to the patient  * Procedure site verified and marked as necessary  * Patient was positioned for comfort  * Consent was signed and verified     Time: 2 pm    Pain scale : 0    Date of procedure: 6/16/2021    Procedure performed by:  Colton Herrera MD    Provider assisted by:    Nicol Hercules LPN          Real time imaging was performed in both transverse and sagittal planes    Nodule  Left Isthmus    Following informed consent, a procedural pause was held to confirm patiient identity and site of biopsy. After sterile preparation, FNA guidance was performed using direct ultrasound guidance to confirm accurate needle placement. 5  aspirations were made using 25 G needles  Samples were submitted to cytology  Patient  tolerated procedure well without complications  After care instructions provided.       Pain scale post procedure : 4 In my judgment no risk for PPH has been identified at this time.

## 2021-06-16 NOTE — PROGRESS NOTES
Room Biopsy    Identified pt with two pt identifiers(name and ). Reviewed record in preparation for visit and have obtained necessary documentation. All patient medications has been reviewed. Chief Complaint   Patient presents with    Biopsy    Thyroid Problem       3 most recent PHQ Screens 2021   Little interest or pleasure in doing things Not at all   Feeling down, depressed, irritable, or hopeless Not at all   Total Score PHQ 2 0     Abuse Screening Questionnaire 2021   Do you ever feel afraid of your partner? N   Are you in a relationship with someone who physically or mentally threatens you? N   Is it safe for you to go home? Y       Health Maintenance Review: Patient reminded of \"due or due soon\" health maintenance. I have asked the patient to contact his/her primary care provider (PCP) for follow-up on his/her health maintenance. Vitals:    21 1304   BP: (!) 152/81   Pulse: 66   Resp: 18   Temp: (!) 95.9 °F (35.5 °C)   TempSrc: Oral   SpO2: 96%   Weight: 199 lb 6.4 oz (90.4 kg)   Height: 5' 5\" (1.651 m)   PainSc:   0 - No pain         No results found for: HBA1C, TKD2CEAV, ICG5DHBT, TIK2OPWG    Coordination of Care Questionnaire:   1) Have you been to an emergency room, urgent care, or hospitalized since your last visit?   no       2. Have seen or consulted any other health care provider since your last visit? NO      Patient is accompanied by self I have received verbal consent from Lisa Kelsey to discuss any/all medical information while they are present in the room.

## 2021-06-16 NOTE — LETTER
6/22/2021 Patient: Qi Daniel YOB: 1940 Date of Visit: 6/16/2021 Rojas Brewster DO 
[address] [address] [address] [employer] Dear Rojas Brewster DO, Thank you for referring Ms. Qi Daniel to [id number] [place] [address] for evaluation. My notes for this consultation are attached. If you have questions, please do not hesitate to call me. I look forward to following your patient along with you. Sincerely, Stiven Novoa MD

## 2021-06-16 NOTE — PATIENT INSTRUCTIONS
SPECIFIC INSTRUCTIONS BELOW Synthroid 50 mcg  A day, on empty stomach with water only, no other meds or food or drinks   For next half hour Take any kind of vitamins, calcium, iron   Pills  4 hours later 
 
 
 
 
------------------------------------------------------------------- Please take tylenol 500 mg or Motrin 400 mg (2 pills of 200 mg dose) OTC,  if there is any biopsy site pain You can go back to your normal routine immediately If you notice any dime sized redness, at the biopsy site, it is normal and do not worry If you have more symptoms and signs requiring emergency assistance,  call 911   or go to Emergency room  
 
 
 
 
-------------Lalita 1961 ----------------- 
 
-ANY tests other than blood work, which you opt to do  outside the  [facility] imaging facilities, you are responsible for prior authorizations if  required 
 
- [phone] [facility] AVS- PLEASE IGNORE  
- YOUR MED LIST IS NOT UP TO DATE AS SOME CHANGES ARE BEING MADE AFTER THE VISIT - [facility] Results *Normal results will not be notified by a phone call starting January 1 2021 *If you have an upcoming visit, the results will be discussed at the visit *Please sign up for MY CHART if you want access to your lab and test results *Abnormal results which require immediate attention will be notified by phone call *Abnormal results which do not require immediate assistance will be notified in 1-2 weeks Refills    -    have your pharmacy send us a refill request . Refills are done max for one year and a visit is a must before refills are extended Follow up appointments -  highly encourage you to make it when you are checking out. We can accommodate you into the schedule based on your clinical situation, but not for extending refills beyond a year.  Labs are important to give refills and is important to get labs before the visit Phone calls  -  Allow  24 hrs. for non-urgent calls to be returned Prior authorization - It may take 2-4 weeks to process Forms  -  FMLA, DMV etc., will take up to 2 weeks to process Cancellations - please notify the office 2 days in advance Samples  - will only be dispensed at visits If not showing for the appointments and cancelling appointments within 24 hours are kept track of and three  of such situations in  two consecutive years will likely be considered for termination from the practice 
 
-------------------------------------------------------------------------------------------------------------------

## 2021-06-19 DIAGNOSIS — R79.89 LOW TSH LEVEL: ICD-10-CM

## 2021-06-19 DIAGNOSIS — E04.2 NONTOXIC MULTINODULAR GOITER: ICD-10-CM

## 2021-06-21 RX ORDER — AMLODIPINE BESYLATE 10 MG/1
TABLET ORAL
Qty: 90 TABLET | Refills: 1 | Status: SHIPPED | OUTPATIENT
Start: 2021-06-21 | End: 2022-01-18

## 2021-06-21 RX ORDER — HYDROCHLOROTHIAZIDE 25 MG/1
TABLET ORAL
Qty: 90 TABLET | Refills: 1 | Status: SHIPPED | OUTPATIENT
Start: 2021-06-21 | End: 2022-01-18

## 2021-06-22 NOTE — PROGRESS NOTES
HISTORY OF PRESENT ILLNESS  Melisa Díaz is a 79 y.o. female. HPI     A year of  F/u after last visit for hypothyroidism  from Jan 2020      Pt  Is getting  thyroid FNA  Done in office today   Pt is compliant with synthroid         Old history  : In the interim , pt had seen hematologist   Had transfusions twice   Compliant  With synthroid             Old history  :   She stopped statin 3 weeks ago  Per my advise   She has not felt any difference   Compliant with synthroid           Old history     Very pleasant lady     Pt changed to hypothyroidism from hyperthyroidism   She is asymptomatic   Some confusion occured at pharmacy   She caught it early on         Past Medical History:   Diagnosis Date    Glaucoma     right eye    HTN (hypertension)     Hyperlipidemia     Thyroid condition        Social History     Socioeconomic History    Marital status: SINGLE     Spouse name: Not on file    Number of children: Not on file    Years of education: Not on file    Highest education level: Not on file   Occupational History    Not on file   Tobacco Use    Smoking status: Former Smoker    Smokeless tobacco: Never Used   Vaping Use    Vaping Use: Never used   Substance and Sexual Activity    Alcohol use: No    Drug use: No    Sexual activity: Not on file   Other Topics Concern    Not on file   Social History Narrative    Not on file     Social Determinants of Health     Financial Resource Strain:     Difficulty of Paying Living Expenses:    Food Insecurity:     Worried About Running Out of Food in the Last Year:     [address] in the Last Year:    Transportation Needs:     Lack of Transportation (Medical):      Lack of Transportation (Non-Medical):    Physical Activity:     Days of Exercise per Week:     Minutes of Exercise per Session:    Stress:     Feeling of Stress :    Social Connections:     Frequency of Communication with Friends and Family:     Frequency of Social Gatherings with Friends and Family:     Attends Church Services:     Active Member of Clubs or Organizations:     Attends Club or Organization Meetings:     Marital Status:    Intimate Partner Violence:     Fear of Current or Ex-Partner:     Emotionally Abused:     Physically Abused:     Sexually Abused:        History reviewed. No pertinent family history. Review of Systems   Constitutional: Negative. HENT: Negative. Eyes: Negative for pain and redness. Respiratory: Negative. Cardiovascular: Negative for chest pain, palpitations and leg swelling. Gastrointestinal: Negative. Negative for constipation. Genitourinary: Negative. Musculoskeletal: positive for  myalgias. Skin: Negative. Neurological: Negative. Endo/Heme/Allergies: Negative. Psychiatric/Behavioral: Negative for depression and memory loss. The patient does not have insomnia. Physical Exam   Constitutional: She is oriented to person, place, and time. She appears well-developed and well-nourished. HENT:   Head: Normocephalic. Eyes: Conjunctivae and EOM are normal. Pupils are equal, round, and reactive to light. Neck: Normal range of motion. Neck supple. No JVD present. No tracheal deviation present. Thyromegaly present. Cardiovascular: Normal rate, regular rhythm and normal heart sounds. No murmur heard. Pulmonary/Chest: Breath sounds normal.   Abdominal: Soft. Bowel sounds are normal.   Musculoskeletal: Normal range of motion. Lymphadenopathy:     She has no cervical adenopathy. Neurological: She is alert and oriented to person, place, and time. She has normal reflexes. Skin: Skin is warm.    Psychiatric: She has a normal mood and affect.       usg from date oct 18 2016 :  Ist is thick at 6 mm and has 0.9 cm nodule   3 left lobe nodules, the superior one is 1.2 cm, middle 3.3 cm and inferior one 1.1 cm       The scan showed diffuse uptake of 40 %         Lab Results   Component Value Date/Time    TSH 1.31 06/09/2021 01:14 PM    T4, Free 1.3 06/09/2021 01:14 PM          ASSESSMENT and PLAN      1. Hypothyroidism : Euthyroid   On  synthroid 50 mcg a day    2. H/o hyperthyroidism :  Resolved   Thyrodi uptake and scan favoring Graves disease / Hashitoxicosis         2. Multiple  Thyroid  nodules, on left lobe   S/p FNA of dominant nodule  Left side jan 2017  --  Got incomplete sample, but the cells drawn were Benign, reassured , Not considering to repeat right away   S/p FNA in office on left  in jan 2020 - NEGATIVE FOR CANCER     June 2021  : performing FNA on another nodule on left side  - IST   Today     3.  Anemia :  Resolved AFTER blood transfusions       Reviewed results with patient and discussed the labs being ordered today/bnv  Patient voiced understanding of plan of care

## 2021-11-08 ENCOUNTER — OFFICE VISIT (OUTPATIENT)
Dept: FAMILY MEDICINE CLINIC | Age: 81
End: 2021-11-08
Payer: MEDICARE

## 2021-11-08 VITALS
SYSTOLIC BLOOD PRESSURE: 131 MMHG | WEIGHT: 203 LBS | HEART RATE: 78 BPM | TEMPERATURE: 97.3 F | DIASTOLIC BLOOD PRESSURE: 78 MMHG | OXYGEN SATURATION: 97 % | BODY MASS INDEX: 33.82 KG/M2 | HEIGHT: 65 IN

## 2021-11-08 DIAGNOSIS — I10 HYPERTENSION, UNSPECIFIED TYPE: Primary | ICD-10-CM

## 2021-11-08 DIAGNOSIS — D64.9 ANEMIA, UNSPECIFIED TYPE: ICD-10-CM

## 2021-11-08 DIAGNOSIS — M81.0 OSTEOPOROSIS, UNSPECIFIED OSTEOPOROSIS TYPE, UNSPECIFIED PATHOLOGICAL FRACTURE PRESENCE: ICD-10-CM

## 2021-11-08 DIAGNOSIS — Z12.31 SCREENING MAMMOGRAM, ENCOUNTER FOR: ICD-10-CM

## 2021-11-08 PROCEDURE — G8427 DOCREV CUR MEDS BY ELIG CLIN: HCPCS | Performed by: FAMILY MEDICINE

## 2021-11-08 PROCEDURE — G8510 SCR DEP NEG, NO PLAN REQD: HCPCS | Performed by: FAMILY MEDICINE

## 2021-11-08 PROCEDURE — G8754 DIAS BP LESS 90: HCPCS | Performed by: FAMILY MEDICINE

## 2021-11-08 PROCEDURE — G8536 NO DOC ELDER MAL SCRN: HCPCS | Performed by: FAMILY MEDICINE

## 2021-11-08 PROCEDURE — 99213 OFFICE O/P EST LOW 20 MIN: CPT | Performed by: FAMILY MEDICINE

## 2021-11-08 PROCEDURE — G8752 SYS BP LESS 140: HCPCS | Performed by: FAMILY MEDICINE

## 2021-11-08 PROCEDURE — G8417 CALC BMI ABV UP PARAM F/U: HCPCS | Performed by: FAMILY MEDICINE

## 2021-11-08 PROCEDURE — 1090F PRES/ABSN URINE INCON ASSESS: CPT | Performed by: FAMILY MEDICINE

## 2021-11-08 PROCEDURE — 1101F PT FALLS ASSESS-DOCD LE1/YR: CPT | Performed by: FAMILY MEDICINE

## 2021-11-08 NOTE — PROGRESS NOTES
Chief Complaint   Patient presents with    New Patient     states that she hasnt been seen in 2-3 years. Last time she was seen she was referred to Endocrinology and Hematology. States that she just kind of got behind with PCP visits.       Visit Vitals  BP (!) 156/84 (BP 1 Location: Left upper arm, BP Patient Position: Sitting)   Pulse 89   Temp 97.3 °F (36.3 °C) (Temporal)   Ht 5' 5\" (1.651 m)   Wt 203 lb (92.1 kg)   SpO2 97%   BMI 33.78 kg/m²

## 2021-11-08 NOTE — PROGRESS NOTES
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 Last time she was seen she was referred to Endocrinology and Hematology. States that she just kind of got behind with PCP visits. HISTORY OF PRESENT ILLNESS:  Established or NEW  Jeaneth Wells is a 79 y.o. female . she comes in for follow up. Been 2 years since her last visit. Still seeing endocrinlogy for thyroid and hematologist for iron deficiency . Has had a total knee replacement right knee in July 2021. Sees the Ortho on [facility]. Hands swollen, right is worse and hard to pick things off flat surfaces, ties shoes and some numbness and tingling and feels cold. Some times it does go to sleep if she has to lie on hand. She is not a new patient. Her last visit here was about 2 years ago to the day. Needs lab  Needs vaccines; Shingles       Covid-19 booster   IMAGING-does not qualify for LDCT  Mammo-due     PAST MEDICAL HISTORY:     Past Medical History:   Diagnosis Date    Glaucoma     right eye    HTN (hypertension)     Hyperlipidemia     Thyroid condition        MEDICATIONS:     Current Outpatient Medications on File Prior to Visit   Medication Sig Dispense Refill    hydroCHLOROthiazide (HYDRODIURIL) 25 mg tablet take 1 tablet by mouth once daily 90 Tablet 1    amLODIPine (NORVASC) 10 mg tablet take 1 tablet by mouth once daily 90 Tablet 1    levothyroxine (SYNTHROID) 50 mcg tablet Take 1 Tablet by mouth Daily (before breakfast).  90 Tablet 4    dorzolamide-timolol (COSOPT) 22.3-6.8 mg/mL ophthalmic solution Administer 1 Drop to both eyes two (2) times a day.  0    latanoprost (XALATAN) 0.005 % ophthalmic solution Administer 1 Drop to right eye daily. No current facility-administered medications on file prior to visit. ALLERGIES:    No Known Allergies      SOCIAL HISTORY:     Social History     Tobacco Use    Smoking status: Former Smoker    Smokeless tobacco: Never Used   Vaping Use    Vaping Use: Never used   Substance Use Topics    Alcohol use: No    Drug use: No       SURGICAL HISTORY:    Past Surgical History:   Procedure Laterality Date    HX ORTHOPAEDIC Right 07/19/2021    right knee replacement        FAMILY HISTORY:    History reviewed. No pertinent family history. REVIEW OF SYSTEMS:    I personally collected this information from all available source present (patient/others in room and records available) -[email or web address]  Review of Systems   Constitutional: Negative for chills, diaphoresis and fever. HENT: Negative for congestion, ear pain, hearing loss, sinus pain, sore throat and tinnitus. Eyes: Negative for blurred vision, double vision and photophobia. Respiratory: Negative for cough, sputum production and shortness of breath. Cardiovascular: Negative for chest pain, palpitations, orthopnea, leg swelling and PND. Gastrointestinal: Negative for abdominal pain, blood in stool, constipation, diarrhea, melena, nausea and vomiting. Genitourinary: Negative for dysuria, frequency and urgency. Musculoskeletal: Positive for back pain and joint pain. Negative for myalgias and neck pain. Skin: Negative for itching and rash. Neurological: Negative for dizziness, tingling, tremors, sensory change, focal weakness and headaches. Endo/Heme/Allergies: Does not bruise/bleed easily.          PHYSICAL EXAMINATION:    Vital Signs:    Visit Vitals  /78 (BP 1 Location: Left upper arm, BP Patient Position: Sitting)   Pulse 78   Temp 97.3 °F (36.3 °C) (Temporal)   Ht 5' 5\" (1.651 m)   Wt 203 lb (92.1 kg)   SpO2 97%   BMI 33.78 kg/m²         Wt Readings from Last 3 Encounters:   11/08/21 203 lb (92.1 kg)   06/16/21 199 lb 6.4 oz (90.4 kg)   06/18/20 208 lb 9.6 oz (94.6 kg)     BP Readings from Last 3 Encounters:   11/08/21 131/78   06/16/21 (!) 152/81   06/18/20 147/75         Physical Exam  Nursing note reviewed. Constitutional:       General: She is not in acute distress. Appearance: Normal appearance. She is not ill-appearing or toxic-appearing. HENT:      Right Ear: Tympanic membrane, ear canal and external ear normal.      Left Ear: Tympanic membrane, ear canal and external ear normal.      Nose: No congestion or rhinorrhea. Mouth/Throat:      Pharynx: No oropharyngeal exudate or posterior oropharyngeal erythema. Eyes:      General: No scleral icterus. Extraocular Movements: Extraocular movements intact. Conjunctiva/sclera: Conjunctivae normal.      Pupils: Pupils are equal, round, and reactive to light. Neck:      Vascular: No carotid bruit. Cardiovascular:      Rate and Rhythm: Normal rate and regular rhythm. Heart sounds: No murmur heard. No friction rub. No gallop. Pulmonary:      Effort: Pulmonary effort is normal.      Breath sounds: Normal breath sounds. No wheezing, rhonchi or rales. Abdominal:      Palpations: Abdomen is soft. Tenderness: There is no abdominal tenderness. Musculoskeletal:         General: No swelling, tenderness or deformity. Cervical back: No rigidity. No muscular tenderness. Right lower leg: No edema. Left lower leg: No edema. Lymphadenopathy:      Cervical: No cervical adenopathy. Skin:     Coloration: Skin is not jaundiced. Findings: No erythema or rash. Neurological:      General: No focal deficit present. Mental Status: She is alert and oriented to person, place, and time. Mental status is at baseline.    Psychiatric:         Mood and Affect: Mood normal.         Behavior: Behavior normal.         Thought Content: Thought content normal.         Judgment: Judgment normal.           ASSESSMENT/PLAN:       2 years since her last visit   Routine health maintenance was discussed   Mammography and DEXA scan was ordered   Routine lab order   Follow-up and treat as indicated. ICD-10-CM ICD-9-CM    1. Hypertension, unspecified type  I10 401.9 CBC WITH AUTOMATED DIFF      METABOLIC PANEL, COMPREHENSIVE      LIPID PANEL      URINALYSIS W/ RFLX MICROSCOPIC   2. Screening mammogram, encounter for  Z12.31 V76.12 [facility] MAMMO BI SCREENING INCL CAD   3. Anemia, unspecified type  D64.9 285.9    4. Osteoporosis, unspecified osteoporosis type, unspecified pathological fracture presence  M81.0 733.00 DEXA BONE DENSITY STUDY AXIAL     Discussion (regarding today's visit with Vika Wheeler);   WE gave the patient a review of medications, treatment, testing such as labs, imagine, referrals and when to call regarding results and appointments.  Reminded patient to keep any and all appointments with specialists, labs, imaging.  Reminded patient to make sure we get copies of any specialists care, labs and imaging.  Reminded patient to call of come by the office if there are any concerns, questions , comments or problems.  The patient verbalized understanding of the care plan and all questions were answered to the patient's satisfaction prior to leaving the office.  The patient was told that failure to comply with recommended testing could result in abnormal health consequences.  The patient was instructed to have yearly routine health maintenance including but not limited to age appropriate vaccines, testing, screening exams.  ALL questions were answered to her satisfaction before leaving the office. The patient actively participated in medical decision making.     This date I spent  21 minutes reviewing chart, previous notes, tests and interviewing and examining patients answering questions providing follow up as well as ordering tests. FOLLOW UP:     Patient knows to keep any and all future visits scheduled unless told otherwise. Patient knows to call, come back if any concerns, questions, comments or problems arise. Signed By: Steph Green DO     November 8, 2021     TIME: 6:02 p.m. This visit was reviewed and signed electronically. It was been completed with voice recognition software and hand typing. It may have syntax and spelling errors despite editing.

## 2021-11-08 NOTE — PATIENT INSTRUCTIONS
General Health and concerns:  HEART HEALTHY DIET:  A heart healthy diet is one that is low in cholesterol (less than 300 mg daily), fat (less than 80 g daily) . You should also minimize carbohydrates / sugars (less amounts of breads, pastas, potato and potato products and sugary foods/snacks, cookies, cakes, etc) . Try to eat whole wheat/multigrain breads and pastas and eat more vegetables. Cook with olive oil (or no oil) and grill, bake, broil or boil foods. Less red meat and more chicken , fish and lean cuts of beef (limited). 4107-6025 calories per day is sufficient [id number] is acceptable for weight loss. EXERCISE:  You should do exercise 3-5 days per week (minimum) to include increasing your heart rate for 30 to 45 minutes. At least a pace of a brisk walk should do that. This build up your heart and lung endurance and muscles and helps many function of the body. OTHER:    IF your condition(s) do not improve, get worse and/or if any concerns arise, please call or come by the office. Routine Health maintenance: You need to get a yearly follow up/physical exam to review, discuss age and gender appropriate exams, labs, vaccines and screening tests. This includes cardiovascular health risk, cancer screens and other juan related topics. Medications-Take all medications as directed. Please do not stop unless you talk to your doctor or health care provider first. Report any problems immediately. PLEASE CHECK THE LIST FROM TODAY's VISIT and make SURE IT IS ACCURATE-Please bring any issues to our concern IMMEDIATELY!! Referrals: if you have been given a referral, please call the office if you do not hear from provider in one week. You may make the appointment yourself. Please keep all appointments with specialists and ask them to send their notes, thoughts, recommendations to us , as your PCP.     KEEP all upcoming appointments with our office UNLESS otherwise and specifically told not to. CHECK your diagnosis/problem list for today and that orders and prescriptions are what we discussed as well as MAKE sure all information is accurate and has been discussed to your satisfaction. PLEASE make sure all your questions have been answered and feel free to call or come back should any concerns arise. Imaging/Labs:  Be sure to get these images in a timely manner. IF your test must be scheduled, let us know if you need help getting this done and if you do not hear from that provider in a week , call us or them. BE SURE to call the office if you do not hear regarding the results in one week after the test is performed Image or lab). It is our intention to inform you of the results ALWAYS, even if normal you should get a notification (Call, portal message). PLEASE diomedes if you do not get the results. PLEASE follow all recommendations and call/come in /ask questions if you do not understand of if problems develop after or in between visits. Failure to comply with recommended health care advise could result in serious health consequences. Thank you for choosing our practice and please let us know how we can help you feel better and stay well!

## 2021-11-12 ENCOUNTER — TRANSCRIBE ORDER (OUTPATIENT)
Dept: SCHEDULING | Age: 81
End: 2021-11-12

## 2021-11-12 DIAGNOSIS — Z12.31 SCREENING MAMMOGRAM FOR HIGH-RISK PATIENT: Primary | ICD-10-CM

## 2021-11-30 ENCOUNTER — HOSPITAL ENCOUNTER (OUTPATIENT)
Dept: MAMMOGRAPHY | Age: 81
Discharge: HOME | End: 2021-11-30
Attending: FAMILY MEDICINE
Payer: MEDICARE

## 2021-11-30 DIAGNOSIS — Z12.31 SCREENING MAMMOGRAM FOR HIGH-RISK PATIENT: ICD-10-CM

## 2021-11-30 DIAGNOSIS — M81.0 OSTEOPOROSIS, UNSPECIFIED OSTEOPOROSIS TYPE, UNSPECIFIED PATHOLOGICAL FRACTURE PRESENCE: ICD-10-CM

## 2021-11-30 DIAGNOSIS — Z12.31 SCREENING MAMMOGRAM, ENCOUNTER FOR: ICD-10-CM

## 2021-11-30 PROCEDURE — 77063 BREAST TOMOSYNTHESIS BI: CPT

## 2021-11-30 PROCEDURE — 77080 DXA BONE DENSITY AXIAL: CPT

## 2021-12-03 LAB
ALBUMIN SERPL-MCNC: 4.6 G/DL (ref 3.6–4.6)
ALBUMIN/GLOB SERPL: 1.4 {RATIO} (ref 1.2–2.2)
ALP SERPL-CCNC: 91 IU/L (ref 44–121)
ALT SERPL-CCNC: 15 IU/L (ref 0–32)
APPEARANCE UR: CLEAR
AST SERPL-CCNC: 20 IU/L (ref 0–40)
BASOPHILS # BLD AUTO: 0 X10E3/UL (ref 0–0.2)
BASOPHILS NFR BLD AUTO: 1 %
BILIRUB SERPL-MCNC: 0.8 MG/DL (ref 0–1.2)
BILIRUB UR QL STRIP: NEGATIVE
BUN SERPL-MCNC: 15 MG/DL (ref 8–27)
BUN/CREAT SERPL: 15 (ref 12–28)
CALCIUM SERPL-MCNC: 10.5 MG/DL (ref 8.7–10.3)
CHLORIDE SERPL-SCNC: 99 MMOL/L (ref 96–106)
CHOLEST SERPL-MCNC: 246 MG/DL (ref 100–199)
CO2 SERPL-SCNC: 28 MMOL/L (ref 20–29)
COLOR UR: YELLOW
CREAT SERPL-MCNC: 1.01 MG/DL (ref 0.57–1)
EOSINOPHIL # BLD AUTO: 0.2 X10E3/UL (ref 0–0.4)
EOSINOPHIL NFR BLD AUTO: 6 %
ERYTHROCYTE [DISTWIDTH] IN BLOOD BY AUTOMATED COUNT: 13.1 % (ref 11.7–15.4)
GLOBULIN SER CALC-MCNC: 3.2 G/DL (ref 1.5–4.5)
GLUCOSE SERPL-MCNC: 97 MG/DL (ref 65–99)
GLUCOSE UR QL: NEGATIVE
HCT VFR BLD AUTO: 38.2 % (ref 34–46.6)
HDLC SERPL-MCNC: 62 MG/DL
HGB BLD-MCNC: 13 G/DL (ref 11.1–15.9)
HGB UR QL STRIP: NEGATIVE
IMM GRANULOCYTES # BLD AUTO: 0 X10E3/UL (ref 0–0.1)
IMM GRANULOCYTES NFR BLD AUTO: 0 %
KETONES UR QL STRIP: NEGATIVE
LDLC SERPL CALC-MCNC: 166 MG/DL (ref 0–99)
LEUKOCYTE ESTERASE UR QL STRIP: NEGATIVE
LYMPHOCYTES # BLD AUTO: 1.5 X10E3/UL (ref 0.7–3.1)
LYMPHOCYTES NFR BLD AUTO: 45 %
MCH RBC QN AUTO: 32 PG (ref 26.6–33)
MCHC RBC AUTO-ENTMCNC: 34 G/DL (ref 31.5–35.7)
MCV RBC AUTO: 94 FL (ref 79–97)
MICRO URNS: NORMAL
MONOCYTES # BLD AUTO: 0.5 X10E3/UL (ref 0.1–0.9)
MONOCYTES NFR BLD AUTO: 14 %
NEUTROPHILS # BLD AUTO: 1.2 X10E3/UL (ref 1.4–7)
NEUTROPHILS NFR BLD AUTO: 34 %
NITRITE UR QL STRIP: NEGATIVE
PH UR STRIP: 7 [PH] (ref 5–7.5)
PLATELET # BLD AUTO: 314 X10E3/UL (ref 150–450)
POTASSIUM SERPL-SCNC: 4.4 MMOL/L (ref 3.5–5.2)
PROT SERPL-MCNC: 7.8 G/DL (ref 6–8.5)
PROT UR QL STRIP: NEGATIVE
RBC # BLD AUTO: 4.06 X10E6/UL (ref 3.77–5.28)
SODIUM SERPL-SCNC: 139 MMOL/L (ref 134–144)
SP GR UR: 1.01 (ref 1–1.03)
TRIGL SERPL-MCNC: 101 MG/DL (ref 0–149)
UROBILINOGEN UR STRIP-MCNC: 0.2 MG/DL (ref 0.2–1)
VLDLC SERPL CALC-MCNC: 18 MG/DL (ref 5–40)
WBC # BLD AUTO: 3.5 X10E3/UL (ref 3.4–10.8)

## 2021-12-14 NOTE — PROGRESS NOTES
The blood count is normal without anemia or infection. The urinalysis is normal.  The kidney function is essentially normal, perhaps from fasting it is a little above the recommended normal so stay hydrated. Liver function is normal.  The cholesterol is above normal and I recommend pravastatin 40 mg daily.

## 2021-12-15 DIAGNOSIS — E78.2 MIXED HYPERLIPIDEMIA: Primary | ICD-10-CM

## 2021-12-15 RX ORDER — PRAVASTATIN SODIUM 20 MG/1
20 TABLET ORAL
Qty: 90 TABLET | Refills: 2 | Status: SHIPPED | OUTPATIENT
Start: 2021-12-15 | End: 2022-09-15

## 2021-12-15 NOTE — PROGRESS NOTES
Identifying Data:  79 y.o. , Mendez Bee , female     HISTORICAL DATA (REVIEWED TODAY):  ALLERGIES-    No Known Allergies    MEDICATION AS OF LAST RECONCILIATION (NOT INCLUDING CHANGES MADE TODAY):    Current Outpatient Medications on File Prior to Visit   Medication Sig Dispense Refill    hydroCHLOROthiazide (HYDRODIURIL) 25 mg tablet take 1 tablet by mouth once daily 90 Tablet 1    amLODIPine (NORVASC) 10 mg tablet take 1 tablet by mouth once daily 90 Tablet 1    levothyroxine (SYNTHROID) 50 mcg tablet Take 1 Tablet by mouth Daily (before breakfast). 90 Tablet 4    dorzolamide-timolol (COSOPT) 22.3-6.8 mg/mL ophthalmic solution Administer 1 Drop to both eyes two (2) times a day.  0    latanoprost (XALATAN) 0.005 % ophthalmic solution Administer 1 Drop to right eye daily. No current facility-administered medications on file prior to visit. 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  Mixed hyperlipidemia  E78.2 272.2 pravastatin (PRAVACHOL) 20 mg tablet             Royer June, DO

## 2022-01-16 DIAGNOSIS — R79.89 LOW TSH LEVEL: ICD-10-CM

## 2022-01-16 DIAGNOSIS — E04.2 NONTOXIC MULTINODULAR GOITER: ICD-10-CM

## 2022-01-18 RX ORDER — HYDROCHLOROTHIAZIDE 25 MG/1
TABLET ORAL
Qty: 90 TABLET | Refills: 1 | Status: SHIPPED | OUTPATIENT
Start: 2022-01-18 | End: 2022-09-02

## 2022-01-18 RX ORDER — AMLODIPINE BESYLATE 10 MG/1
TABLET ORAL
Qty: 90 TABLET | Refills: 1 | Status: SHIPPED | OUTPATIENT
Start: 2022-01-18 | End: 2022-09-15

## 2022-03-18 PROBLEM — E04.2 MULTIPLE THYROID NODULES: Status: ACTIVE | Noted: 2017-02-25

## 2022-03-18 PROBLEM — E05.20 TOXIC MULTINODULAR GOITER: Status: ACTIVE | Noted: 2017-05-08

## 2022-05-06 ENCOUNTER — OFFICE VISIT (OUTPATIENT)
Dept: PRIMARY CARE CLINIC | Age: 82
End: 2022-05-06
Payer: MEDICARE

## 2022-05-06 VITALS
RESPIRATION RATE: 20 BRPM | WEIGHT: 206.2 LBS | OXYGEN SATURATION: 98 % | SYSTOLIC BLOOD PRESSURE: 128 MMHG | BODY MASS INDEX: 34.35 KG/M2 | HEART RATE: 59 BPM | TEMPERATURE: 97.4 F | DIASTOLIC BLOOD PRESSURE: 70 MMHG | HEIGHT: 65 IN

## 2022-05-06 DIAGNOSIS — Z00.00 MEDICARE ANNUAL WELLNESS VISIT, SUBSEQUENT: Primary | ICD-10-CM

## 2022-05-06 DIAGNOSIS — E03.9 ACQUIRED HYPOTHYROIDISM: ICD-10-CM

## 2022-05-06 DIAGNOSIS — I10 PRIMARY HYPERTENSION: ICD-10-CM

## 2022-05-06 DIAGNOSIS — E78.5 DYSLIPIDEMIA: ICD-10-CM

## 2022-05-06 DIAGNOSIS — G56.03 CARPAL TUNNEL SYNDROME, BILATERAL: ICD-10-CM

## 2022-05-06 DIAGNOSIS — H40.9 GLAUCOMA, UNSPECIFIED GLAUCOMA TYPE, UNSPECIFIED LATERALITY: ICD-10-CM

## 2022-05-06 PROCEDURE — G8399 PT W/DXA RESULTS DOCUMENT: HCPCS | Performed by: FAMILY MEDICINE

## 2022-05-06 PROCEDURE — G8427 DOCREV CUR MEDS BY ELIG CLIN: HCPCS | Performed by: FAMILY MEDICINE

## 2022-05-06 PROCEDURE — G8754 DIAS BP LESS 90: HCPCS | Performed by: FAMILY MEDICINE

## 2022-05-06 PROCEDURE — 1101F PT FALLS ASSESS-DOCD LE1/YR: CPT | Performed by: FAMILY MEDICINE

## 2022-05-06 PROCEDURE — G8417 CALC BMI ABV UP PARAM F/U: HCPCS | Performed by: FAMILY MEDICINE

## 2022-05-06 PROCEDURE — G0439 PPPS, SUBSEQ VISIT: HCPCS | Performed by: FAMILY MEDICINE

## 2022-05-06 PROCEDURE — G8536 NO DOC ELDER MAL SCRN: HCPCS | Performed by: FAMILY MEDICINE

## 2022-05-06 PROCEDURE — 1090F PRES/ABSN URINE INCON ASSESS: CPT | Performed by: FAMILY MEDICINE

## 2022-05-06 PROCEDURE — 99213 OFFICE O/P EST LOW 20 MIN: CPT | Performed by: FAMILY MEDICINE

## 2022-05-06 PROCEDURE — G8510 SCR DEP NEG, NO PLAN REQD: HCPCS | Performed by: FAMILY MEDICINE

## 2022-05-06 PROCEDURE — G8752 SYS BP LESS 140: HCPCS | Performed by: FAMILY MEDICINE

## 2022-05-06 RX ORDER — DICLOFENAC SODIUM 10 MG/G
4 GEL TOPICAL
Qty: 1 EACH | Refills: 1 | Status: SHIPPED | OUTPATIENT
Start: 2022-05-06

## 2022-05-06 RX ORDER — LYSINE HCL 500 MG
600 TABLET ORAL
COMMUNITY

## 2022-05-06 NOTE — PROGRESS NOTES
HPI     Chief Complaint:   Chief Complaint   Patient presents with    \[facility] [place] [facility]\"" Care        HPI:  Micheline Maldonado is a 79 y.o. female with a history of HTN, HLD, Hypothyroidism, Glaucoma. Her previous PCP was Dr. Earl De León. 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 No cough or chest pain. HLD: Compliant with statin, no myalgias. Lab Results   Component Value Date/Time    Cholesterol, total 246 (H) 12/02/2021 07:40 AM    HDL Cholesterol 62 12/02/2021 07:40 AM    LDL, calculated 166 (H) 12/02/2021 07:40 AM    LDL, calculated 153 (H) 06/10/2020 02:39 PM    VLDL, calculated 18 12/02/2021 07:40 AM    VLDL, calculated 26 06/10/2020 02:39 PM    Triglyceride 101 12/02/2021 07:40 AM      Hypothyroidism: w/ history of multinodular goiter, followed by Endocrinologist Dr. Adi Coles. Stable on synthroid and is compliant. Denies hot/cold intolerance. Glaucoma: Follows with SUJEY Cuevas. Review of Systems   Constitutional: Negative for chills and fever. Eyes: Negative for double vision and discharge. Respiratory: Negative for cough and shortness of breath. Cardiovascular: Negative for chest pain and palpitations. Psychiatric/Behavioral: Negative for hallucinations and substance abuse. Reviewed PmHx, FmHx, SocHx as well as meds and allergies, updated and dated in the chart.     Past Medical History:   Diagnosis Date    Glaucoma     right eye    HTN (hypertension)     Hyperlipidemia     Thyroid condition      Social History     Tobacco Use    Smoking status: Former Smoker    Smokeless tobacco: Never Used   Vaping Use    Vaping Use: Never used   Substance Use Topics    Alcohol use: No    Drug use: No       Current Outpatient Medications on File Prior to Visit   Medication Sig Dispense Refill    Calcium Carbonate-Vit D3-Min 600 mg calcium- 400 unit tab Take 600 mg by mouth.  hydroCHLOROthiazide (HYDRODIURIL) 25 mg tablet take 1 tablet by mouth once daily 90 Tablet 1    amLODIPine (NORVASC) 10 mg tablet take 1 tablet by mouth once daily 90 Tablet 1    pravastatin (PRAVACHOL) 20 mg tablet Take 1 Tablet by mouth nightly. 90 Tablet 2    levothyroxine (SYNTHROID) 50 mcg tablet Take 1 Tablet by mouth Daily (before breakfast). 90 Tablet 4    dorzolamide-timolol (COSOPT) 22.3-6.8 mg/mL ophthalmic solution Administer 1 Drop to both eyes two (2) times a day.  0    latanoprost (XALATAN) 0.005 % ophthalmic solution Administer 1 Drop to right eye daily. No current facility-administered medications on file prior to visit. No Known Allergies       Objective     Visit Vitals  /70 (BP 1 Location: Right upper arm, BP Patient Position: Sitting, BP Cuff Size: Adult)   Pulse (!) 59   Temp 97.4 °F (36.3 °C) (Temporal)   Resp 20   Ht 5' 5\" (1.651 m)   Wt 206 lb 3.2 oz (93.5 kg)   SpO2 98%   BMI 34.31 kg/m²     Physical Exam  Vitals and nursing note reviewed. Constitutional:       Appearance: Normal appearance. Cardiovascular:      Rate and Rhythm: Normal rate and regular rhythm. Heart sounds: Normal heart sounds. Pulmonary:      Effort: Pulmonary effort is normal. No respiratory distress. Breath sounds: Normal breath sounds. Musculoskeletal:      Comments: Tingling in tips of first 3 digits bilaterally. Positive phalen sign bilaterally. Negative tinnel sign bilaterally. Neurological:      General: No focal deficit present. Mental Status: She is alert and oriented to person, place, and time. Psychiatric:         Mood and Affect: Mood normal.         Behavior: Behavior normal.            Assessment and Plan     Diagnoses and all orders for this visit:    1.  Medicare annual wellness visit, subsequent  Comments:  Age appropriate guidance given and HM updated. 2. Primary hypertension  Comments: At goal. Continue current regimen. Orders:  -     CBC W/O DIFF  -     METABOLIC PANEL, COMPREHENSIVE    3. Dyslipidemia  Comments:  Continue statin. Orders:  -     METABOLIC PANEL, COMPREHENSIVE    4. Acquired hypothyroidism  Comments:  Managed by Endocrinology. 5. Glaucoma, unspecified glaucoma type, unspecified laterality  Comments:  Managed by SUJEY Wu. 6. Carpal tunnel syndrome, bilateral  Comments:  Based on history and exam. Use wrist brace. Topical Diclofenac and follow up prn. Orders:  -     diclofenac (VOLTAREN) 1 % gel; Apply 4 g to affected area four (4) times daily as needed for Pain. As applicable:  Medication Side Effects and Warnings were discussed with patient, as indicated. Patient Labs were reviewed and or requested, as indicated. Patient Past Records were reviewed and or requested, as indicated. Follow-up and Dispositions    · Return in about 6 months (around 11/6/2022) for chronic follow up.             Noel Smith MD  [address] [place] [address]

## 2022-05-06 NOTE — PROGRESS NOTES
Bishnu  [address] [place] [address], [address] [place] [address]  [phone]    Date of visit: 5/6/2022       This is a Subsequent Medicare Annual Wellness Visit (AWV), (Performed more than 12 months after effective date of Medicare Part B enrollment and 12 months after last preventive visit.)    I have reviewed the patient's medical history in detail and updated the computerized patient record. History obtained from: the patient. female  79 y.o. BLACK/  Depression Risk Factor Screening:     3 most recent PHQ Screens 5/6/2022   Little interest or pleasure in doing things Not at all   Feeling down, depressed, irritable, or hopeless Not at all   Total Score PHQ 2 0          Fall Risk Factor Screening:     Fall Risk Assessment, last 12 mths 5/6/2022   Able to walk? Yes   Fall in past 12 months? 0   Do you feel unsteady? 0   Are you worried about falling 0       Alcohol Risk Screen    Do you average more than 1 drink per night or more than 7 drinks a week:  No    On any one occasion in the past three months have you have had more than 3 drinks containing alcohol:  No         Functional Ability and Level of Safety:    Hearing: Hearing is good. Activities of Daily Living: The home contains: no safety equipment. Patient does total self care      Ambulation: with no difficulty      Abuse Screen:  Patient is not abused         Histories     Reviewed PmHx, FmHx, SocHx as well as meds and allergies, updated and dated in the chart.     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  diclofenac (VOLTAREN) 1 % gel Apply 4 g to affected area four (4) times daily as needed for Pain. 1 Each 1    hydroCHLOROthiazide (HYDRODIURIL) 25 mg tablet take 1 tablet by mouth once daily 90 Tablet 1    amLODIPine (NORVASC) 10 mg tablet take 1 tablet by mouth once daily 90 Tablet 1    pravastatin (PRAVACHOL) 20 mg tablet Take 1 Tablet by mouth nightly. 90 Tablet 2    levothyroxine (SYNTHROID) 50 mcg tablet Take 1 Tablet by mouth Daily (before breakfast). 90 Tablet 4    dorzolamide-timolol (COSOPT) 22.3-6.8 mg/mL ophthalmic solution Administer 1 Drop to both eyes two (2) times a day.  0    latanoprost (XALATAN) 0.005 % ophthalmic solution Administer 1 Drop to right eye daily. Family History   Problem Relation Age of Onset    Breast Cancer Mother 36     Social History     Tobacco Use    Smoking status: Former Smoker    Smokeless tobacco: Never Used   Substance Use Topics    Alcohol use: No       Current Complaints and Pertinent Exam   If patient is due for chronic medical conditions and/or has acute concerns in addition to the medicare wellness visit, they have been informed there may be a copay for the additional services provided, and agree to do both. ROS & Physical Exam: please see acute note if applicable      Diet and Exercise: Tries to walk often, eats well balanced diet. BP Readings from Last 3 Encounters:   05/06/22 128/70   11/08/21 131/78   06/16/21 (!) 152/81      Wt Readings from Last 3 Encounters:   05/06/22 206 lb 3.2 oz (93.5 kg)   11/08/21 203 lb (92.1 kg)   06/16/21 199 lb 6.4 oz (90.4 kg)     Body mass index is 34.31 kg/m².    No exam data present    Was the patient's timed Up & Go test unsteady or longer than 30 seconds? no    Evaluation of Cognitive Function   Mood/affect:  happy  Orientation: Person, Place, Time and Situation  Appearance: age appropriate and younger than stated age  Family member/caregiver input: n/a    Specialists/Care Team   Laurel Camacho has established care with the following healthcare providers:  Patient Care Team:  Angela Menon MD as PCP - General (Family Medicine)  Noble Hodgkins, MD (Endocrinology Physician)  Adams Moses MD (Internal Medicine Physician)  Moises Minaya MD as Physician (Ophthalmology)     [address] [place] [address] Maintenance Topics with due status: Overdue       Topic Date Due    Shingrix Vaccine Age 49> Never done    DTaP/Tdap/Td series 01/02/2008    Pneumococcal 65+ years 05/09/2013    COVID-19 Vaccine 07/01/2021     Health Maintenance Topics with due status: Not Due       Topic Last Completion Date    Depression Screen 11/08/2021    Lipid Screen 12/02/2021    Medicare Yearly Exam 05/06/2022     Health Maintenance Topics with due status: Completed       Topic Last Completion Date    Flu Vaccine 10/01/2021    Bone Densitometry (Dexa) Screening 11/30/2021         Colon cancer:  Recommendation: Colonoscopy every 10y or annual FIT test from 50-75 or every 3 year stool DNA based test with consideration of ongoing screening from 76-85. Lung cancer (LDCT): Recommendation: Yearly LDCT for pts 55-77 w 30-pack year hx and currently smoke or quit <15 yr ago. Hepatitis C:  Recommendation: One time screening for all patient's aged 18-79. Diabetes:   Recommendation: USPSTF recommends screening ages 38-67 y/o if overweight or obese.  Medicare covers screening in those patients who are overweight, obese, have HTN or dyslipiemia, a personal history of gestational diabetes or prior elevated blood sugar, a family hx of DM, or any patient over age 72    Lipids:   Recommendation: screening for hyperlipidemia every 5 years after age 39        PREVENTIVE CARE - FEMALE SCREENINGS     Cervical cancer: Recommendation: Every 3 yr from 21-29 and every 5 yr from 33-67, with Pap and HPV testing. and Not Indicated    Breast cancer: Recommendation:  USPSTF recommends screening mammography every 2 yr from 54-69. The decision to start screening mammography in women prior to age 48 years should be an individual one. Women who place a higher value on the potential benefit than the potential harms may choose to begin biennial screening between the ages of 36 and 52 years. Medicare covers annual screening mammography and USPSTF also recommends women with a personal or family history of breast, ovarian, tubal, or peritoneal cancer or who have an ancestry ([address]) associated with breast cancer susceptibility 1 and 2 (BRCA1/2) gene mutations with an appropriate brief familial risk assessment tool.  Women with a positive result on the risk assessment tool should receive genetic counseling and, if indicated after counseling, genetic testing    Osteoporosis: Recommendation: Screen all women at 72, earlier if elevated risk    AAA:   Recommendation: One-time screening if family history of AAA and Not Indicated      IMMUNIZATIONS     Immunization History   Administered Date(s) Administered    COVID-19, Aurther Dyers, Primary or Immunocompromised Series, MRNA, PF, 100mcg/0.5mL 01/01/2021, 02/01/2021    Influenza Vaccine 10/01/2021    Influenza Vaccine (Tri) Adjuvanted (>65 Yrs FLUAD TRI 12968) 10/22/2018    Influenza, High-dose, Quadrivalent (>65 Yrs Fluzone High Dose Quad 87437) 09/02/2020    Pneumococcal Polysaccharide (PPSV-23) 05/09/2012    Td, Adsorbed PF 01/01/2008       Pneumovax:   Recommendation: PPSV23 once for all >65 and high risk <65     Prevnar:   Recommendation: PCV13 only if >65 and immunocompromised or residing in a nursing home, or in areas of low childhood Pneumococcal vaccination and Not Indicated    Influenza:   Recommendation: Vaccination annually, high dose if 72 or older    Shingrix:  Recommendation: Vaccination 2 shots 2-6 months apart for all age >47    TDaP:    Recommendation: Vaccination Booster with TDaP every 10 yr. Discussion of Advance Directive   Discussed with Olayinka Crerajat her ability to prepare and advance directive in the case that an injury or illness causes her to be unable to make health care decisions. Date of ACP Conversation: 05/06/22  Persons included in Conversation:  patient  Length of ACP Conversation in minutes:  <16 minutes (Non-Billable)    Authorized Decision Maker (if patient is incapable of making informed decisions): This person is:   Next of Kin by law (only applies in absence of a Healthcare Power of  or Legal Guardian)      Primary Decision Maker: [facility] - [phone]        For Patients with Decision Making Capacity:   Values/Goals: Exploration of values, goals, and preferences if recovery is not expected, even with continued medical treatment in the event of:  Imminent death  Severe, permanent brain injury  \"In these circumstances, what matters most to you? \"  Care focused more on comfort or quality of life. Conversation Outcomes / Follow-Up Plan:   ACP in process - information provided, considering goals and options    Assessment/Plan     Diagnoses and all orders for this visit:    1. Medicare annual wellness visit, subsequent  Comments:  Age appropriate guidance given and HM updated. 2. Primary hypertension  Comments: At goal. Continue current regimen. Orders:  -     CBC W/O DIFF  -     METABOLIC PANEL, COMPREHENSIVE    3. Dyslipidemia  Comments:  Continue statin. Orders:  -     METABOLIC PANEL, COMPREHENSIVE    4. Acquired hypothyroidism  Comments:  Managed by Endocrinology. 5. Glaucoma, unspecified glaucoma type, unspecified laterality  Comments:  Managed by [facility]. 6. Carpal tunnel syndrome, bilateral  Comments:  Based on history and exam. Use wrist brace. Topical Diclofenac and follow up prn. Orders:  -     diclofenac (VOLTAREN) 1 % gel; Apply 4 g to affected area four (4) times daily as needed for Pain. Follow-up and Dispositions    · Return in about 6 months (around 11/6/2022) for chronic follow up.          Harinder Foster MD  [address] [place] [address]

## 2022-05-07 LAB
ALBUMIN SERPL-MCNC: 4.8 G/DL (ref 3.6–4.6)
ALBUMIN/GLOB SERPL: 1.6 {RATIO} (ref 1.2–2.2)
ALP SERPL-CCNC: 84 IU/L (ref 44–121)
ALT SERPL-CCNC: 17 IU/L (ref 0–32)
AST SERPL-CCNC: 24 IU/L (ref 0–40)
BILIRUB SERPL-MCNC: 0.8 MG/DL (ref 0–1.2)
BUN SERPL-MCNC: 11 MG/DL (ref 8–27)
BUN/CREAT SERPL: 11 (ref 12–28)
CALCIUM SERPL-MCNC: 10.1 MG/DL (ref 8.7–10.3)
CHLORIDE SERPL-SCNC: 99 MMOL/L (ref 96–106)
CO2 SERPL-SCNC: 27 MMOL/L (ref 20–29)
CREAT SERPL-MCNC: 1.01 MG/DL (ref 0.57–1)
EGFR: 56 ML/MIN/1.73
ERYTHROCYTE [DISTWIDTH] IN BLOOD BY AUTOMATED COUNT: 11.5 % (ref 11.7–15.4)
GLOBULIN SER CALC-MCNC: 3 G/DL (ref 1.5–4.5)
GLUCOSE SERPL-MCNC: 88 MG/DL (ref 65–99)
HCT VFR BLD AUTO: 37.8 % (ref 34–46.6)
HGB BLD-MCNC: 13 G/DL (ref 11.1–15.9)
MCH RBC QN AUTO: 33.3 PG (ref 26.6–33)
MCHC RBC AUTO-ENTMCNC: 34.4 G/DL (ref 31.5–35.7)
MCV RBC AUTO: 97 FL (ref 79–97)
PLATELET # BLD AUTO: 292 X10E3/UL (ref 150–450)
POTASSIUM SERPL-SCNC: 4.2 MMOL/L (ref 3.5–5.2)
PROT SERPL-MCNC: 7.8 G/DL (ref 6–8.5)
RBC # BLD AUTO: 3.9 X10E6/UL (ref 3.77–5.28)
SODIUM SERPL-SCNC: 140 MMOL/L (ref 134–144)
WBC # BLD AUTO: 4.4 X10E3/UL (ref 3.4–10.8)

## 2022-06-14 ENCOUNTER — OFFICE VISIT (OUTPATIENT)
Dept: ENDOCRINOLOGY | Age: 82
End: 2022-06-14
Payer: MEDICARE

## 2022-06-14 ENCOUNTER — DOCUMENTATION ONLY (OUTPATIENT)
Dept: ENDOCRINOLOGY | Age: 82
End: 2022-06-14

## 2022-06-14 VITALS
WEIGHT: 209.2 LBS | BODY MASS INDEX: 34.85 KG/M2 | RESPIRATION RATE: 18 BRPM | TEMPERATURE: 97.5 F | HEART RATE: 64 BPM | HEIGHT: 65 IN | DIASTOLIC BLOOD PRESSURE: 81 MMHG | OXYGEN SATURATION: 96 % | SYSTOLIC BLOOD PRESSURE: 153 MMHG

## 2022-06-14 DIAGNOSIS — E04.2 MULTINODULAR GOITER: ICD-10-CM

## 2022-06-14 DIAGNOSIS — E03.2 HYPOTHYROIDISM DUE TO MEDICATION: Primary | ICD-10-CM

## 2022-06-14 PROCEDURE — G8754 DIAS BP LESS 90: HCPCS | Performed by: INTERNAL MEDICINE

## 2022-06-14 PROCEDURE — 99214 OFFICE O/P EST MOD 30 MIN: CPT | Performed by: INTERNAL MEDICINE

## 2022-06-14 PROCEDURE — G8399 PT W/DXA RESULTS DOCUMENT: HCPCS | Performed by: INTERNAL MEDICINE

## 2022-06-14 PROCEDURE — 1123F ACP DISCUSS/DSCN MKR DOCD: CPT | Performed by: INTERNAL MEDICINE

## 2022-06-14 PROCEDURE — G8536 NO DOC ELDER MAL SCRN: HCPCS | Performed by: INTERNAL MEDICINE

## 2022-06-14 PROCEDURE — G8510 SCR DEP NEG, NO PLAN REQD: HCPCS | Performed by: INTERNAL MEDICINE

## 2022-06-14 PROCEDURE — G8417 CALC BMI ABV UP PARAM F/U: HCPCS | Performed by: INTERNAL MEDICINE

## 2022-06-14 PROCEDURE — 1101F PT FALLS ASSESS-DOCD LE1/YR: CPT | Performed by: INTERNAL MEDICINE

## 2022-06-14 PROCEDURE — G8427 DOCREV CUR MEDS BY ELIG CLIN: HCPCS | Performed by: INTERNAL MEDICINE

## 2022-06-14 PROCEDURE — 1090F PRES/ABSN URINE INCON ASSESS: CPT | Performed by: INTERNAL MEDICINE

## 2022-06-14 PROCEDURE — G8753 SYS BP > OR = 140: HCPCS | Performed by: INTERNAL MEDICINE

## 2022-06-14 RX ORDER — LEVOTHYROXINE SODIUM 50 UG/1
50 TABLET ORAL
Qty: 90 TABLET | Refills: 4 | Status: SHIPPED | OUTPATIENT
Start: 2022-06-14

## 2022-06-14 NOTE — LETTER
6/14/2022    Patient: Mikayla Oneill   YOB: 1940   Date of Visit: 6/14/2022     Tanika Keita MD  [place] [id number]  [address] [facility] [address]    Dear Tanika Keita MD,      Thank you for referring Ms. Mikayla Oneill to [address] [place] [address] for evaluation. My notes for this consultation are attached. If you have questions, please do not hesitate to call me. I look forward to following your patient along with you.       Sincerely,    Rebeca Cutler MD

## 2022-06-14 NOTE — PATIENT INSTRUCTIONS
SPECIFIC INSTRUCTIONS BELOW     Synthroid 50 mcg  A day, on empty stomach with water only, no other meds or food or drinks   For next half hour     Take any kind of vitamins, calcium, iron   Pills  4 hours later          -------------PAY ATTENTION TO THESE GENERAL INSTRUCTIONS -----------------      - The medications prescribed at this visit will not be available at pharmacy until 6 pm       - YOUR MED LIST IS NOT UP TO DATE AS SOME CHANGES ARE BEING MADE AFTER THE VISIT - FOLLOW SPECIFIC INSTRUCTIONS  ABOVE     -ANY tests other than blood work, which you opt to do  outside the  [facility] facilities, you are responsible for prior authorizations if  required    - [address] UP TO DATE ON YOUR AVS- PLEASE IGNORE     Results     *Normal results will not be notified by a phone call starting January 1 2021   *If you have an upcoming visit, the results will be discussed at the visit   *Please sign up for MY CHART if you want access to your lab and test results  *Abnormal results which require immediate attention will be notified by phone call   *Abnormal results which do not require immediate assistance will be notified in 1-2 weeks       Refills    -    have your pharmacy send us a refill request . Refills are done max for one year and a visit is a must before refills are extended    Follow up appointments -  highly encourage you to make it when you are checking out. We can accommodate you into the schedule based on your clinical situation, but not for extending refills beyond a year. Labs are important to give refills and is important to get labs before the visit     Phone calls  -  Allow  24 hrs.  for non-urgent calls to be returned  Prior authorization - It may take 2-4 weeks to process  Forms  -  FMLA, DMV etc., will take up to 2 weeks to process  Cancellations - please notify the office 2 days in advance   Samples  - will only be dispensed at visits       If not showing for the appointments and cancelling appointments within 24 hours are kept track of and three  of such situations in  two consecutive years will likely be considered for termination from the practice    -------------------------------------------------------------------------------------------------------------------

## 2022-06-14 NOTE — PROGRESS NOTES
HISTORY OF PRESENT ILLNESS  Lisandro Swain is a 79 y.o. female. HPI  A year of  F/u after last visit for hypothyroidism   AND   MNG   from June 2021    Such  A pleasant person, she remembers that I mentioned about repeat biopsy  Compliant with synthrodi       June 2021       Pt  Is getting  thyroid FNA  Done in office today   Pt is compliant with synthroid   Old history     Very pleasant lady   Pt changed to hypothyroidism from hyperthyroidism   She is asymptomatic   Some confusion occured at pharmacy   She caught it early on       Review of Systems   Constitutional: Negative. Psychiatric/Behavioral: Negative for depression and memory loss. The patient does not have insomnia. Physical Exam   Constitutional: She is oriented to person, place, and time. She appears well-developed and well-nourished. Neck: Normal range of motion. Neck supple. No JVD present. No tracheal deviation present. Thyromegaly present. Psychiatric: She has a normal mood and affect.       usg from date oct 18 2016 :  Isthmus   is thick at 6 mm and has 0.9 cm nodule   3 left lobe nodules, the superior one is 1.2 cm, middle 3.3 cm and inferior one 1.1 cm       The FISCHER uptake and scan showed diffuse uptake of 40 %   From 2017        Lab Results   Component Value Date/Time    TSH 1.97 06/08/2022 02:02 PM    T4, Free 1.2 06/08/2022 02:02 PM          ASSESSMENT and PLAN      1. Hypothyroidism : Euthyroid   On  synthroid 50 mcg a day    2. H/o hyperthyroidism :  Resolved   Thyrodi uptake and scan favoring Graves disease / Hashitoxicosis  45%   Nov 2017         2.   Multiple  Thyroid  nodules, on left lobe   S/p FNA of dominant nodule  Left side jan 2017  --  Got incomplete sample, but the cells drawn were Benign, reassured , Not considering to repeat right away   S/p FNA in office on left  in jan 2020 - NEGATIVE FOR CANCER   June 2021  : performed  FNA on another nodule on left side  - IST   - NON diagnostic  ( insufficient cell ) June 2022  : as it has been difficult getting samples from her thyroid nodules, given her age and prognosis of microscopic undiagnosed  thyroid cancers,  She and I agreed to follow the goiter only by ultrasounds     Ordering  usg     3.  Anemia :  Resolved AFTER blood transfusions       Reviewed results with patient and discussed the labs being ordered today/bnv  Patient voiced understanding of plan of care

## 2022-06-14 NOTE — PROGRESS NOTES
Reviewed the [facility] biopsy result from june 2021  - left isthmus nodule   Non -diagnostic       Brionna Serrano MD

## 2022-06-14 NOTE — PROGRESS NOTES
1. Have you been to the ER, urgent care clinic since your last visit? No  Hospitalized since your last visit? No    2. Have you seen or consulted any other health care providers outside of the [address] [place] [address] since your last visit? Include any pap smears or colon screening.  No    Wt Readings from Last 3 Encounters:   06/14/22 209 lb 3.2 oz (94.9 kg)   05/06/22 206 lb 3.2 oz (93.5 kg)   11/08/21 203 lb (92.1 kg)     Temp Readings from Last 3 Encounters:   06/14/22 97.5 °F (36.4 °C) (Temporal)   05/06/22 97.4 °F (36.3 °C) (Temporal)   11/08/21 97.3 °F (36.3 °C) (Temporal)     BP Readings from Last 3 Encounters:   06/14/22 (!) 153/81   05/06/22 128/70   11/08/21 131/78     Pulse Readings from Last 3 Encounters:   06/14/22 64   05/06/22 (!) 59   11/08/21 78

## 2022-06-23 ENCOUNTER — HOSPITAL ENCOUNTER (OUTPATIENT)
Dept: ULTRASOUND IMAGING | Age: 82
Discharge: HOME | End: 2022-06-23
Attending: INTERNAL MEDICINE
Payer: MEDICARE

## 2022-06-23 DIAGNOSIS — E04.2 MULTINODULAR GOITER: ICD-10-CM

## 2022-06-23 PROCEDURE — 76536 US EXAM OF HEAD AND NECK: CPT

## 2022-08-29 DIAGNOSIS — R79.89 LOW TSH LEVEL: ICD-10-CM

## 2022-08-29 DIAGNOSIS — E04.2 NONTOXIC MULTINODULAR GOITER: ICD-10-CM

## 2022-09-02 RX ORDER — HYDROCHLOROTHIAZIDE 25 MG/1
TABLET ORAL
Qty: 90 TABLET | Refills: 1 | Status: SHIPPED | OUTPATIENT
Start: 2022-09-02

## 2022-09-14 DIAGNOSIS — E04.2 NONTOXIC MULTINODULAR GOITER: ICD-10-CM

## 2022-09-14 DIAGNOSIS — R79.89 LOW TSH LEVEL: ICD-10-CM

## 2022-09-14 DIAGNOSIS — E78.2 MIXED HYPERLIPIDEMIA: ICD-10-CM

## 2022-09-15 RX ORDER — AMLODIPINE BESYLATE 10 MG/1
TABLET ORAL
Qty: 90 TABLET | Refills: 1 | Status: SHIPPED | OUTPATIENT
Start: 2022-09-15

## 2022-09-15 RX ORDER — PRAVASTATIN SODIUM 20 MG/1
20 TABLET ORAL
Qty: 90 TABLET | Refills: 1 | Status: SHIPPED | OUTPATIENT
Start: 2022-09-15

## 2022-10-04 ENCOUNTER — TRANSCRIBE ORDER (OUTPATIENT)
Dept: SCHEDULING | Age: 82
End: 2022-10-04

## 2022-10-04 DIAGNOSIS — Z12.31 VISIT FOR SCREENING MAMMOGRAM: Primary | ICD-10-CM

## 2022-12-01 ENCOUNTER — HOSPITAL ENCOUNTER (OUTPATIENT)
Dept: MAMMOGRAPHY | Age: 82
Discharge: HOME | End: 2022-12-01
Attending: FAMILY MEDICINE
Payer: MEDICARE

## 2022-12-01 DIAGNOSIS — Z12.31 VISIT FOR SCREENING MAMMOGRAM: ICD-10-CM

## 2022-12-01 PROCEDURE — 77063 BREAST TOMOSYNTHESIS BI: CPT

## 2023-03-01 DIAGNOSIS — R79.89 LOW TSH LEVEL: ICD-10-CM

## 2023-03-01 DIAGNOSIS — E04.2 NONTOXIC MULTINODULAR GOITER: ICD-10-CM

## 2023-03-01 RX ORDER — HYDROCHLOROTHIAZIDE 25 MG/1
TABLET ORAL
Qty: 90 TABLET | Refills: 1 | Status: SHIPPED | OUTPATIENT
Start: 2023-03-01

## 2023-03-14 DIAGNOSIS — E04.2 NONTOXIC MULTINODULAR GOITER: ICD-10-CM

## 2023-03-14 DIAGNOSIS — R79.89 LOW TSH LEVEL: ICD-10-CM

## 2023-03-14 RX ORDER — AMLODIPINE BESYLATE 10 MG/1
TABLET ORAL
Qty: 90 TABLET | Refills: 1 | Status: SHIPPED | OUTPATIENT
Start: 2023-03-14

## 2023-03-27 DIAGNOSIS — E78.2 MIXED HYPERLIPIDEMIA: ICD-10-CM

## 2023-03-28 RX ORDER — PRAVASTATIN SODIUM 20 MG/1
20 TABLET ORAL
Qty: 90 TABLET | Refills: 1 | Status: SHIPPED | OUTPATIENT
Start: 2023-03-28

## 2023-04-21 DIAGNOSIS — E03.2 HYPOTHYROIDISM DUE TO MEDICATION: Primary | ICD-10-CM

## 2023-04-21 DIAGNOSIS — E04.2 MULTINODULAR GOITER: ICD-10-CM

## 2023-04-22 DIAGNOSIS — E03.2 HYPOTHYROIDISM DUE TO MEDICATION: Primary | ICD-10-CM

## 2023-04-22 DIAGNOSIS — E04.2 MULTINODULAR GOITER: ICD-10-CM

## 2023-05-26 RX ORDER — HYDROCHLOROTHIAZIDE 25 MG/1
1 TABLET ORAL DAILY
COMMUNITY
Start: 2023-03-01

## 2023-05-26 RX ORDER — AMLODIPINE BESYLATE 10 MG/1
1 TABLET ORAL DAILY
COMMUNITY
Start: 2023-03-14

## 2023-05-26 RX ORDER — DORZOLAMIDE HYDROCHLORIDE AND TIMOLOL MALEATE 20; 5 MG/ML; MG/ML
1 SOLUTION/ DROPS OPHTHALMIC 2 TIMES DAILY
COMMUNITY
Start: 2017-01-16

## 2023-05-26 RX ORDER — PRAVASTATIN SODIUM 20 MG
1 TABLET ORAL NIGHTLY
COMMUNITY
Start: 2023-03-28

## 2023-05-26 RX ORDER — LATANOPROST 50 UG/ML
1 SOLUTION/ DROPS OPHTHALMIC DAILY
COMMUNITY

## 2023-05-26 RX ORDER — LEVOTHYROXINE SODIUM 0.05 MG/1
50 TABLET ORAL
COMMUNITY
Start: 2022-06-14 | End: 2023-06-13 | Stop reason: SDUPTHER

## 2023-06-08 ENCOUNTER — HOSPITAL ENCOUNTER (OUTPATIENT)
Facility: HOSPITAL | Age: 83
Discharge: HOME | End: 2023-06-08
Payer: MEDICARE

## 2023-06-08 DIAGNOSIS — M54.10 RADICULOPATHY, UNSPECIFIED SPINAL REGION: ICD-10-CM

## 2023-06-08 PROCEDURE — 72110 X-RAY EXAM L-2 SPINE 4/>VWS: CPT

## 2023-10-12 NOTE — TELEPHONE ENCOUNTER
Pt needs rx refill on hydroCHLOROthiazide (HYDRODIURIL) 25 MG tablet and amLODIPine (NORVASC) 10 MG tablet  [facility]

## 2023-10-18 NOTE — TELEPHONE ENCOUNTER
Previous T.  [facility] patient called requesting refill on RX-Amlodipine, RX-HCTZ, RX-Pravastain be sent to [facility]    Next appt 11/02/23

## 2023-10-24 RX ORDER — LATANOPROST 50 UG/ML
1 SOLUTION/ DROPS OPHTHALMIC DAILY
OUTPATIENT
Start: 2023-10-24

## 2023-10-24 RX ORDER — AMLODIPINE BESYLATE 10 MG/1
10 TABLET ORAL DAILY
Qty: 30 TABLET | Refills: 2 | Status: SHIPPED | OUTPATIENT
Start: 2023-10-24

## 2023-10-24 RX ORDER — PRAVASTATIN SODIUM 20 MG
20 TABLET ORAL NIGHTLY
Qty: 30 TABLET | Refills: 2 | Status: SHIPPED | OUTPATIENT
Start: 2023-10-24

## 2023-11-02 ENCOUNTER — OFFICE VISIT (OUTPATIENT)
Dept: PRIMARY CARE CLINIC | Facility: CLINIC | Age: 83
End: 2023-11-02

## 2023-11-02 VITALS
OXYGEN SATURATION: 99 % | RESPIRATION RATE: 16 BRPM | SYSTOLIC BLOOD PRESSURE: 162 MMHG | TEMPERATURE: 98 F | BODY MASS INDEX: 34.09 KG/M2 | HEIGHT: 65 IN | WEIGHT: 204.6 LBS | DIASTOLIC BLOOD PRESSURE: 88 MMHG | HEART RATE: 68 BPM

## 2023-11-02 DIAGNOSIS — E78.2 MIXED HYPERLIPIDEMIA: ICD-10-CM

## 2023-11-02 DIAGNOSIS — Z00.00 MEDICARE ANNUAL WELLNESS VISIT, SUBSEQUENT: Primary | ICD-10-CM

## 2023-11-02 DIAGNOSIS — G89.29 CHRONIC BILATERAL LOW BACK PAIN WITHOUT SCIATICA: ICD-10-CM

## 2023-11-02 DIAGNOSIS — E03.9 HYPOTHYROIDISM, UNSPECIFIED TYPE: ICD-10-CM

## 2023-11-02 DIAGNOSIS — I10 ESSENTIAL (PRIMARY) HYPERTENSION: ICD-10-CM

## 2023-11-02 DIAGNOSIS — M54.50 CHRONIC BILATERAL LOW BACK PAIN WITHOUT SCIATICA: ICD-10-CM

## 2023-11-02 RX ORDER — AMLODIPINE BESYLATE 10 MG/1
10 TABLET ORAL DAILY
Qty: 90 TABLET | Refills: 1 | Status: SHIPPED | OUTPATIENT
Start: 2023-11-02

## 2023-11-02 RX ORDER — HYDROCHLOROTHIAZIDE 25 MG/1
25 TABLET ORAL DAILY
Qty: 90 TABLET | Refills: 1 | Status: SHIPPED | OUTPATIENT
Start: 2023-11-02

## 2023-11-02 RX ORDER — PRAVASTATIN SODIUM 20 MG
20 TABLET ORAL NIGHTLY
Qty: 90 TABLET | Refills: 1 | Status: SHIPPED | OUTPATIENT
Start: 2023-11-02

## 2023-11-02 SDOH — ECONOMIC STABILITY: FOOD INSECURITY: WITHIN THE PAST 12 MONTHS, YOU WORRIED THAT YOUR FOOD WOULD RUN OUT BEFORE YOU GOT MONEY TO BUY MORE.: NEVER TRUE

## 2023-11-02 SDOH — ECONOMIC STABILITY: INCOME INSECURITY: HOW HARD IS IT FOR YOU TO PAY FOR THE VERY BASICS LIKE FOOD, HOUSING, MEDICAL CARE, AND HEATING?: NOT HARD AT ALL

## 2023-11-02 SDOH — ECONOMIC STABILITY: HOUSING INSECURITY
IN THE LAST 12 MONTHS, WAS THERE A TIME WHEN YOU DID NOT HAVE A STEADY PLACE TO SLEEP OR SLEPT IN A SHELTER (INCLUDING NOW)?: NO

## 2023-11-02 SDOH — ECONOMIC STABILITY: FOOD INSECURITY: WITHIN THE PAST 12 MONTHS, THE FOOD YOU BOUGHT JUST DIDN'T LAST AND YOU DIDN'T HAVE MONEY TO GET MORE.: NEVER TRUE

## 2023-11-02 ASSESSMENT — PATIENT HEALTH QUESTIONNAIRE - PHQ9
SUM OF ALL RESPONSES TO PHQ QUESTIONS 1-9: 0
1. LITTLE INTEREST OR PLEASURE IN DOING THINGS: 0
SUM OF ALL RESPONSES TO PHQ QUESTIONS 1-9: 0
SUM OF ALL RESPONSES TO PHQ9 QUESTIONS 1 & 2: 0
SUM OF ALL RESPONSES TO PHQ QUESTIONS 1-9: 0
SUM OF ALL RESPONSES TO PHQ QUESTIONS 1-9: 0
2. FEELING DOWN, DEPRESSED OR HOPELESS: 0

## 2023-11-02 ASSESSMENT — LIFESTYLE VARIABLES
HOW OFTEN DO YOU HAVE A DRINK CONTAINING ALCOHOL: NEVER
HOW MANY STANDARD DRINKS CONTAINING ALCOHOL DO YOU HAVE ON A TYPICAL DAY: PATIENT DOES NOT DRINK

## 2023-11-02 NOTE — PROGRESS NOTES
Medicare Annual Wellness Visit    [address] is here for Medicare AWV (Needs Rx r/f. No acute issues. Has been doing PT for balance. )    Assessment & Plan   Medicare annual wellness visit, subsequent  Essential (primary) hypertension  Comments:  BP above goal today, but she ran out of her medication. Sent new RX, she will  today. Continue home monitoring, parameters discussed. Orders:  -     amLODIPine (NORVASC) 10 MG tablet; Take 1 tablet by mouth daily, Disp-90 tablet, R-1Normal  -     hydroCHLOROthiazide (HYDRODIURIL) 25 MG tablet; Take 1 tablet by mouth daily, Disp-90 tablet, R-1Normal  Mixed hyperlipidemia  Comments:  compliant with statin, due for labs. will recheck lipids. Orders:  -     pravastatin (PRAVACHOL) 20 MG tablet; Take 1 tablet by mouth nightly, Disp-90 tablet, R-1Normal  -     CBC  -     Comprehensive Metabolic Panel  -     Lipid Panel  Hypothyroidism, unspecified type  Comments:  on synthroid. monitored by Endocrinology. Chronic bilateral low back pain without sciatica  Comments:  she is seeing ortho and going to PT   Recommendations for Preventive Services Due: see orders and patient instructions/AVS.  Recommended screening schedule for the next 5-10 years is provided to the patient in written form: see Patient Instructions/AVS.     Return in about 6 months (around 5/2/2024) for COV. Subjective   The following acute and/or chronic problems were also addressed today:  HTN: Blood pressure above goal today, but pt ran out of her BP med. She takes her BP at home and reports readings have been normal. She denies headaches, dizziness or blurred vision. HLD: She is compliant with statin and denies side effects . Has not had labs checked in a few years. Hypothyroidism: She is on synthroid which who is monitored by Endo. Back pain: She has been seeing ortho, going to PT and using lidocaine patches with relief of her back pain.      Patient's complete Health Risk Assessment

## 2023-11-07 LAB
ALBUMIN SERPL-MCNC: 4.3 G/DL (ref 3.7–4.7)
ALBUMIN/GLOB SERPL: 1.6 {RATIO} (ref 1.2–2.2)
ALP SERPL-CCNC: 64 IU/L (ref 44–121)
ALT SERPL-CCNC: 11 IU/L (ref 0–32)
AST SERPL-CCNC: 22 IU/L (ref 0–40)
BILIRUB SERPL-MCNC: 0.7 MG/DL (ref 0–1.2)
BUN SERPL-MCNC: 20 MG/DL (ref 8–27)
BUN/CREAT SERPL: 22 (ref 12–28)
CALCIUM SERPL-MCNC: 9.7 MG/DL (ref 8.7–10.3)
CHLORIDE SERPL-SCNC: 101 MMOL/L (ref 96–106)
CHOLEST SERPL-MCNC: 156 MG/DL (ref 100–199)
CO2 SERPL-SCNC: 28 MMOL/L (ref 20–29)
CREAT SERPL-MCNC: 0.89 MG/DL (ref 0.57–1)
EGFRCR SERPLBLD CKD-EPI 2021: 64 ML/MIN/1.73
ERYTHROCYTE [DISTWIDTH] IN BLOOD BY AUTOMATED COUNT: 14.6 % (ref 11.7–15.4)
GLOBULIN SER CALC-MCNC: 2.7 G/DL (ref 1.5–4.5)
GLUCOSE SERPL-MCNC: 75 MG/DL (ref 70–99)
HCT VFR BLD AUTO: 36.7 % (ref 34–46.6)
HDLC SERPL-MCNC: 54 MG/DL
HGB BLD-MCNC: 11.9 G/DL (ref 11.1–15.9)
LDLC SERPL CALC-MCNC: 86 MG/DL (ref 0–99)
MCH RBC QN AUTO: 32.5 PG (ref 26.6–33)
MCHC RBC AUTO-ENTMCNC: 32.4 G/DL (ref 31.5–35.7)
MCV RBC AUTO: 100 FL (ref 79–97)
PLATELET # BLD AUTO: 257 X10E3/UL (ref 150–450)
POTASSIUM SERPL-SCNC: 4.3 MMOL/L (ref 3.5–5.2)
PROT SERPL-MCNC: 7 G/DL (ref 6–8.5)
RBC # BLD AUTO: 3.66 X10E6/UL (ref 3.77–5.28)
SODIUM SERPL-SCNC: 141 MMOL/L (ref 134–144)
TRIGL SERPL-MCNC: 86 MG/DL (ref 0–149)
VLDLC SERPL CALC-MCNC: 16 MG/DL (ref 5–40)
WBC # BLD AUTO: 3.5 X10E3/UL (ref 3.4–10.8)

## 2023-12-04 ENCOUNTER — HOSPITAL ENCOUNTER (OUTPATIENT)
Facility: HOSPITAL | Age: 83
Discharge: HOME | End: 2023-12-07
Attending: FAMILY MEDICINE
Payer: MEDICARE

## 2023-12-04 DIAGNOSIS — Z12.31 VISIT FOR SCREENING MAMMOGRAM: ICD-10-CM

## 2023-12-04 PROCEDURE — 77063 BREAST TOMOSYNTHESIS BI: CPT

## 2024-04-22 DIAGNOSIS — I10 ESSENTIAL (PRIMARY) HYPERTENSION: ICD-10-CM

## 2024-04-22 RX ORDER — HYDROCHLOROTHIAZIDE 25 MG/1
25 TABLET ORAL DAILY
Qty: 90 TABLET | Refills: 1 | Status: SHIPPED | OUTPATIENT
Start: 2024-04-22

## 2024-06-20 ENCOUNTER — OFFICE VISIT (OUTPATIENT)
Age: 84
End: 2024-06-20
Payer: MEDICARE

## 2024-06-20 VITALS
HEART RATE: 61 BPM | SYSTOLIC BLOOD PRESSURE: 183 MMHG | BODY MASS INDEX: 33.89 KG/M2 | HEIGHT: 65 IN | WEIGHT: 203.4 LBS | DIASTOLIC BLOOD PRESSURE: 90 MMHG | TEMPERATURE: 97.5 F | OXYGEN SATURATION: 95 %

## 2024-06-20 DIAGNOSIS — E04.2 MULTINODULAR GOITER: Primary | ICD-10-CM

## 2024-06-20 DIAGNOSIS — E03.2 HYPOTHYROIDISM DUE TO MEDICAMENTS AND OTHER EXOGENOUS SUBSTANCES: ICD-10-CM

## 2024-06-20 PROCEDURE — 1036F TOBACCO NON-USER: CPT | Performed by: INTERNAL MEDICINE

## 2024-06-20 PROCEDURE — G8417 CALC BMI ABV UP PARAM F/U: HCPCS | Performed by: INTERNAL MEDICINE

## 2024-06-20 PROCEDURE — G8428 CUR MEDS NOT DOCUMENT: HCPCS | Performed by: INTERNAL MEDICINE

## 2024-06-20 PROCEDURE — 3080F DIAST BP >= 90 MM HG: CPT | Performed by: INTERNAL MEDICINE

## 2024-06-20 PROCEDURE — 1123F ACP DISCUSS/DSCN MKR DOCD: CPT | Performed by: INTERNAL MEDICINE

## 2024-06-20 PROCEDURE — 1090F PRES/ABSN URINE INCON ASSESS: CPT | Performed by: INTERNAL MEDICINE

## 2024-06-20 PROCEDURE — 3077F SYST BP >= 140 MM HG: CPT | Performed by: INTERNAL MEDICINE

## 2024-06-20 PROCEDURE — 99214 OFFICE O/P EST MOD 30 MIN: CPT | Performed by: INTERNAL MEDICINE

## 2024-06-20 PROCEDURE — G8399 PT W/DXA RESULTS DOCUMENT: HCPCS | Performed by: INTERNAL MEDICINE

## 2024-06-20 RX ORDER — PHENOL 1.4 %
2 AEROSOL, SPRAY (ML) MUCOUS MEMBRANE DAILY
COMMUNITY

## 2024-06-20 RX ORDER — LEVOTHYROXINE SODIUM 0.05 MG/1
50 TABLET ORAL
Qty: 90 TABLET | Refills: 3 | Status: SHIPPED | OUTPATIENT
Start: 2024-06-20

## 2024-06-20 RX ORDER — OMEGA-3S/DHA/EPA/FISH OIL/D3 300MG-1000
400 CAPSULE ORAL DAILY
COMMUNITY

## 2024-06-20 RX ORDER — M-VIT,TX,IRON,MINS/CALC/FOLIC 27MG-0.4MG
1 TABLET ORAL DAILY
COMMUNITY

## 2024-06-20 NOTE — PROGRESS NOTES
[facility]              Harriett Melendrez MD FACE         HISTORY OF PRESENT ILLNESS  Gabriela Muller is a 83 y.o. female.  HPI  A year of  F/u after last visit for hypothyroidism   AND   MNG   from June 2023     Very nice , pleasant patient  , who has good memory  of  events is here  She did nto get labs done or the usg       June 2023     C/o cold symptoms   She takes synthroid      Old history     Very pleasant lady   Pt changed to hypothyroidism from hyperthyroidism   She is asymptomatic   Some confusion occured at pharmacy   She caught it early on       Review of Systems   Constitutional: Negative.    Psychiatric/Behavioral: Negative for depression and memory loss. The patient does not have insomnia.        Physical Exam   Constitutional: She is oriented to person, place, and time. She appears well-developed and well-nourished.   Neck: Normal range of motion. Neck supple. No JVD present. No tracheal deviation present. Thyromegaly present.   Psychiatric: She has a normal mood and affect.       Labs   Ordered       ASSESSMENT and PLAN    1. Hypothyroidism : Euthyroid   On  synthroid 50 mcg a day ( due for labs, phlebotomist left )   Ordered labs for next time     2.   Multiple nodular  Goiter : asymptomatic   S/p FNA of dominant nodule  Left side jan 2017  --  Got incomplete sample, but the cells drawn were Benign, reassured , Not considering to repeat right away   S/p FNA in office on left  in jan 2020 - NEGATIVE FOR CANCER   June 2021  : performed  FNA on another nodule on left side  - IST   - NON diagnostic  ( insufficient cell )   June 2022  :Isthmus hyperechoic nodule with extrathyroidal extension 0.8 x 0.7 x 0.7 cm. Unchanged size, previously more hypoechoic. TI-RADS Category 5.  Right lobe isoechoic nodule 1.6 x 1.0 x 1.4 cm. Not previously identified. TI-RADS Category 3.   Left lobe hyperechoic nodule 1.5 x 1.0 x 1.2 cm. Previously 1.2 x 1.1 x 1.2 cm. TI-RADS Category

## 2024-06-20 NOTE — PATIENT INSTRUCTIONS
SPECIFIC INSTRUCTIONS BELOW     Synthroid 50 mcg  A day, on empty stomach with water only, no other meds or food or drinks   For next half hour      Take any kind of vitamins, calcium, iron   Pills  4 hours later        -------------PAY ATTENTION TO THESE GENERAL INSTRUCTIONS -----------------      - The medications prescribed at this visit will not be available at pharmacy until 6 pm       - YOUR MED LIST IS NOT UP TO DATE AS SOME CHANGES ARE BEING MADE AFTER THE VISIT - FOLLOW SPECIFIC INSTRUCTIONS  ABOVE     -ANY tests other than blood work, which you opt to do  outside the  [facility] facilities, you are responsible for prior authorizations if  required    - HEALTH MAINTENANCE IS NOT GOING TO BE UP TO DATE ON YOUR AVS- PLEASE IGNORE     Results     *Normal results will not be notified by a phone call starting January 1 2021   *If you have an upcoming visit, the results will be discussed at the visit   *Please sign up for MY CHART if you want access to your lab and test results  *Abnormal results which require immediate attention will be notified by phone call   *Abnormal results which do not require immediate assistance will be notified in 1-2 weeks       Refills    -    have your pharmacy send us a refill request . Refills are done max for one year and a visit is a must before refills are extended    Follow up appointments -  highly encourage you to make it when you are checking out. We can accommodate you into the schedule based on your clinical situation, but not for extending refills beyond a year. Labs are important to give refills and is important to get labs before the visit     Phone calls  -  Allow  24 hrs. for non-urgent calls to be returned  Prior authorization - It may take 2-4 weeks to process  Forms  -  FMLA, DMV etc., will take up to 2 weeks to process  Cancellations - please notify the office 2 days in advance   Samples  - will only be dispensed at visits       If not showing for the

## 2024-06-25 ENCOUNTER — NURSE ONLY (OUTPATIENT)
Age: 84
End: 2024-06-25

## 2024-06-25 ENCOUNTER — TELEPHONE (OUTPATIENT)
Age: 84
End: 2024-06-25

## 2024-06-25 DIAGNOSIS — E04.2 MULTINODULAR GOITER: Primary | ICD-10-CM

## 2024-06-25 DIAGNOSIS — E04.2 MULTINODULAR GOITER: ICD-10-CM

## 2024-06-25 DIAGNOSIS — E03.2 HYPOTHYROIDISM DUE TO MEDICATION: ICD-10-CM

## 2024-06-25 LAB
T4 FREE SERPL-MCNC: 1.4 NG/DL (ref 0.8–1.5)
TSH SERPL DL<=0.05 MIU/L-ACNC: 0.73 UIU/ML (ref 0.36–3.74)

## 2024-07-01 ENCOUNTER — HOSPITAL ENCOUNTER (OUTPATIENT)
Facility: HOSPITAL | Age: 84
Discharge: HOME | End: 2024-07-04
Attending: INTERNAL MEDICINE
Payer: MEDICARE

## 2024-07-01 DIAGNOSIS — E04.2 MULTINODULAR GOITER: ICD-10-CM

## 2024-07-01 PROCEDURE — 76536 US EXAM OF HEAD AND NECK: CPT

## 2024-07-18 DIAGNOSIS — E78.2 MIXED HYPERLIPIDEMIA: ICD-10-CM

## 2024-07-18 DIAGNOSIS — I10 ESSENTIAL (PRIMARY) HYPERTENSION: ICD-10-CM

## 2024-07-18 RX ORDER — AMLODIPINE BESYLATE 10 MG/1
10 TABLET ORAL DAILY
Qty: 90 TABLET | Refills: 1 | Status: SHIPPED | OUTPATIENT
Start: 2024-07-18

## 2024-07-18 RX ORDER — PRAVASTATIN SODIUM 20 MG
20 TABLET ORAL NIGHTLY
Qty: 90 TABLET | Refills: 1 | Status: SHIPPED | OUTPATIENT
Start: 2024-07-18

## 2024-07-24 ENCOUNTER — TELEPHONE (OUTPATIENT)
Age: 84
End: 2024-07-24

## 2024-07-24 NOTE — TELEPHONE ENCOUNTER
----- Message from Harriett Melendrez MD sent at 7/23/2024  8:31 AM EDT -----  Many nodules but all are smaller and stable  in size - no need for biopsy at this time     Patient aware of providers message.

## 2024-10-26 DIAGNOSIS — I10 ESSENTIAL (PRIMARY) HYPERTENSION: ICD-10-CM

## 2024-10-28 RX ORDER — HYDROCHLOROTHIAZIDE 25 MG/1
25 TABLET ORAL DAILY
Qty: 90 TABLET | Refills: 0 | Status: SHIPPED | OUTPATIENT
Start: 2024-10-28

## 2024-11-15 ENCOUNTER — OFFICE VISIT (OUTPATIENT)
Dept: PRIMARY CARE CLINIC | Facility: CLINIC | Age: 84
End: 2024-11-15

## 2024-11-15 VITALS
SYSTOLIC BLOOD PRESSURE: 140 MMHG | HEIGHT: 65 IN | BODY MASS INDEX: 34.82 KG/M2 | OXYGEN SATURATION: 98 % | TEMPERATURE: 98.3 F | WEIGHT: 209 LBS | DIASTOLIC BLOOD PRESSURE: 93 MMHG

## 2024-11-15 DIAGNOSIS — Z23 ENCOUNTER FOR IMMUNIZATION: ICD-10-CM

## 2024-11-15 DIAGNOSIS — Z00.00 MEDICARE ANNUAL WELLNESS VISIT, SUBSEQUENT: Primary | ICD-10-CM

## 2024-11-15 DIAGNOSIS — I10 ESSENTIAL (PRIMARY) HYPERTENSION: ICD-10-CM

## 2024-11-15 DIAGNOSIS — E78.2 MIXED HYPERLIPIDEMIA: ICD-10-CM

## 2024-11-15 PROBLEM — D64.9 ANEMIA: Status: ACTIVE | Noted: 2021-07-13

## 2024-11-15 PROBLEM — M19.90 OSTEOARTHROSIS: Status: ACTIVE | Noted: 2021-07-13

## 2024-11-15 PROBLEM — B02.9 HERPES ZOSTER: Status: RESOLVED | Noted: 2022-10-11 | Resolved: 2024-11-15

## 2024-11-15 PROBLEM — R20.0 ABSENCE OF SENSATION: Status: RESOLVED | Noted: 2021-07-13 | Resolved: 2024-11-15

## 2024-11-15 PROBLEM — E03.9 HYPOTHYROIDISM: Status: ACTIVE | Noted: 2021-07-13

## 2024-11-15 RX ORDER — SODIUM, POTASSIUM,MAG SULFATES 17.5-3.13G
SOLUTION, RECONSTITUTED, ORAL ORAL
COMMUNITY
Start: 2024-10-09

## 2024-11-15 RX ORDER — FERROUS SULFATE 325(65) MG
1 TABLET ORAL DAILY
COMMUNITY
Start: 2024-10-26

## 2024-11-15 RX ORDER — HYDROCHLOROTHIAZIDE 25 MG/1
25 TABLET ORAL DAILY
Qty: 90 TABLET | Refills: 1 | Status: SHIPPED | OUTPATIENT
Start: 2024-11-15

## 2024-11-15 RX ORDER — AMLODIPINE BESYLATE 10 MG/1
10 TABLET ORAL DAILY
Qty: 90 TABLET | Refills: 1 | Status: SHIPPED | OUTPATIENT
Start: 2024-11-15

## 2024-11-15 RX ORDER — PRAVASTATIN SODIUM 20 MG
20 TABLET ORAL NIGHTLY
Qty: 90 TABLET | Refills: 1 | Status: SHIPPED | OUTPATIENT
Start: 2024-11-15

## 2024-11-15 RX ORDER — OMEPRAZOLE 40 MG/1
40 CAPSULE, DELAYED RELEASE ORAL DAILY
COMMUNITY
Start: 2024-11-06

## 2024-11-15 SDOH — ECONOMIC STABILITY: FOOD INSECURITY: WITHIN THE PAST 12 MONTHS, THE FOOD YOU BOUGHT JUST DIDN'T LAST AND YOU DIDN'T HAVE MONEY TO GET MORE.: NEVER TRUE

## 2024-11-15 SDOH — ECONOMIC STABILITY: INCOME INSECURITY: HOW HARD IS IT FOR YOU TO PAY FOR THE VERY BASICS LIKE FOOD, HOUSING, MEDICAL CARE, AND HEATING?: NOT HARD AT ALL

## 2024-11-15 SDOH — ECONOMIC STABILITY: FOOD INSECURITY: WITHIN THE PAST 12 MONTHS, YOU WORRIED THAT YOUR FOOD WOULD RUN OUT BEFORE YOU GOT MONEY TO BUY MORE.: NEVER TRUE

## 2024-11-15 ASSESSMENT — PATIENT HEALTH QUESTIONNAIRE - PHQ9
SUM OF ALL RESPONSES TO PHQ QUESTIONS 1-9: 0
2. FEELING DOWN, DEPRESSED OR HOPELESS: NOT AT ALL
SUM OF ALL RESPONSES TO PHQ9 QUESTIONS 1 & 2: 0
1. LITTLE INTEREST OR PLEASURE IN DOING THINGS: NOT AT ALL
SUM OF ALL RESPONSES TO PHQ QUESTIONS 1-9: 0

## 2024-11-15 NOTE — PROGRESS NOTES
Medicare Annual Wellness Visit    Gabriela Muller is here for Medicare AWV    Assessment & Plan   Medicare annual wellness visit, subsequent  Essential (primary) hypertension  Comments:  BP above goal today. Refilled amlodipine and HCTZ. Continue home monitoring, parameters discussed.  Orders:  -     Comprehensive Metabolic Panel  -     amLODIPine (NORVASC) 10 MG tablet; Take 1 tablet by mouth daily, Disp-90 tablet, R-1Normal  -     hydroCHLOROthiazide (HYDRODIURIL) 25 MG tablet; Take 1 tablet by mouth daily, Disp-90 tablet, R-1Normal  Mixed hyperlipidemia  Comments:  Compliant with statin, re-check lipids today  Orders:  -     Lipid Panel  -     pravastatin (PRAVACHOL) 20 MG tablet; Take 1 tablet by mouth nightly, Disp-90 tablet, R-1Normal  Encounter for immunization  -     Pneumococcal, PCV20, PREVNAR 20, (age 6w+), IM, PF    Recommendations for Preventive Services Due: see orders and patient instructions/AVS.    Recommended screening schedule for the next 5-10 years is provided to the patient in written form: see Patient Instructions/AVS.     Return in about 6 months (around 5/15/2025) for HTN f/u.     Subjective   The following acute and/or chronic problems were also addressed today:      Hypertension: Currently on amlodipine 10 mg daily and HCTZ 25 mg daily. No missed noses in the past week. Denies chest pain, headache, difficulty breathing, sudden vision changes. Diet includes pig feet (2-3 times per year) fried chicken, spaghetti, vegetables, fruit, oatmeal; drinking water and diet Dr. Javier. She does not exercise. Was going 3-4 times per week. Denies tobacco use.    Patient's complete Health Risk Assessment and screening values have been reviewed and are found in Flowsheets. The following problems were reviewed today and where indicated follow up appointments were made and/or referrals ordered.    Positive Risk Factor Screenings with Interventions:    Fall Risk:  Do you feel unsteady or are you worried

## 2024-11-16 LAB
ALBUMIN SERPL-MCNC: 4.1 G/DL (ref 3.7–4.7)
ALP SERPL-CCNC: 74 IU/L (ref 44–121)
ALT SERPL-CCNC: 19 IU/L (ref 0–32)
AST SERPL-CCNC: 26 IU/L (ref 0–40)
BILIRUB SERPL-MCNC: 0.6 MG/DL (ref 0–1.2)
BUN SERPL-MCNC: 19 MG/DL (ref 8–27)
BUN/CREAT SERPL: 22 (ref 12–28)
CALCIUM SERPL-MCNC: 9.9 MG/DL (ref 8.7–10.3)
CHLORIDE SERPL-SCNC: 97 MMOL/L (ref 96–106)
CHOLEST SERPL-MCNC: 152 MG/DL (ref 100–199)
CO2 SERPL-SCNC: 27 MMOL/L (ref 20–29)
CREAT SERPL-MCNC: 0.86 MG/DL (ref 0.57–1)
EGFRCR SERPLBLD CKD-EPI 2021: 66 ML/MIN/1.73
GLOBULIN SER CALC-MCNC: 2.5 G/DL (ref 1.5–4.5)
GLUCOSE SERPL-MCNC: 86 MG/DL (ref 70–99)
HDLC SERPL-MCNC: 47 MG/DL
LDLC SERPL CALC-MCNC: 89 MG/DL (ref 0–99)
POTASSIUM SERPL-SCNC: 4.3 MMOL/L (ref 3.5–5.2)
PROT SERPL-MCNC: 6.6 G/DL (ref 6–8.5)
SODIUM SERPL-SCNC: 138 MMOL/L (ref 134–144)
TRIGL SERPL-MCNC: 86 MG/DL (ref 0–149)
VLDLC SERPL CALC-MCNC: 16 MG/DL (ref 5–40)

## 2024-12-13 ENCOUNTER — HOSPITAL ENCOUNTER (OUTPATIENT)
Facility: HOSPITAL | Age: 84
Discharge: HOME | End: 2024-12-16
Attending: FAMILY MEDICINE
Payer: MEDICARE

## 2024-12-13 DIAGNOSIS — Z12.31 OTHER SCREENING MAMMOGRAM: ICD-10-CM

## 2024-12-13 PROCEDURE — 77063 BREAST TOMOSYNTHESIS BI: CPT

## 2025-05-15 ENCOUNTER — OFFICE VISIT (OUTPATIENT)
Dept: PRIMARY CARE CLINIC | Facility: CLINIC | Age: 85
End: 2025-05-15
Payer: MEDICARE

## 2025-05-15 VITALS
RESPIRATION RATE: 16 BRPM | BODY MASS INDEX: 32.65 KG/M2 | HEIGHT: 65 IN | OXYGEN SATURATION: 96 % | SYSTOLIC BLOOD PRESSURE: 152 MMHG | WEIGHT: 196 LBS | HEART RATE: 64 BPM | DIASTOLIC BLOOD PRESSURE: 88 MMHG

## 2025-05-15 DIAGNOSIS — K21.9 GASTROESOPHAGEAL REFLUX DISEASE WITHOUT ESOPHAGITIS: ICD-10-CM

## 2025-05-15 DIAGNOSIS — I10 ESSENTIAL (PRIMARY) HYPERTENSION: Primary | ICD-10-CM

## 2025-05-15 DIAGNOSIS — E78.2 MIXED HYPERLIPIDEMIA: ICD-10-CM

## 2025-05-15 DIAGNOSIS — E66.811 OBESITY (BMI 30.0-34.9): ICD-10-CM

## 2025-05-15 DIAGNOSIS — E03.2 HYPOTHYROIDISM DUE TO MEDICAMENTS AND OTHER EXOGENOUS SUBSTANCES: ICD-10-CM

## 2025-05-15 PROCEDURE — G8399 PT W/DXA RESULTS DOCUMENT: HCPCS | Performed by: NURSE PRACTITIONER

## 2025-05-15 PROCEDURE — 1159F MED LIST DOCD IN RCRD: CPT | Performed by: NURSE PRACTITIONER

## 2025-05-15 PROCEDURE — 1160F RVW MEDS BY RX/DR IN RCRD: CPT | Performed by: NURSE PRACTITIONER

## 2025-05-15 PROCEDURE — G8417 CALC BMI ABV UP PARAM F/U: HCPCS | Performed by: NURSE PRACTITIONER

## 2025-05-15 PROCEDURE — 1036F TOBACCO NON-USER: CPT | Performed by: NURSE PRACTITIONER

## 2025-05-15 PROCEDURE — 1090F PRES/ABSN URINE INCON ASSESS: CPT | Performed by: NURSE PRACTITIONER

## 2025-05-15 PROCEDURE — G8427 DOCREV CUR MEDS BY ELIG CLIN: HCPCS | Performed by: NURSE PRACTITIONER

## 2025-05-15 PROCEDURE — 1123F ACP DISCUSS/DSCN MKR DOCD: CPT | Performed by: NURSE PRACTITIONER

## 2025-05-15 PROCEDURE — 3079F DIAST BP 80-89 MM HG: CPT | Performed by: NURSE PRACTITIONER

## 2025-05-15 PROCEDURE — 3077F SYST BP >= 140 MM HG: CPT | Performed by: NURSE PRACTITIONER

## 2025-05-15 PROCEDURE — 99214 OFFICE O/P EST MOD 30 MIN: CPT | Performed by: NURSE PRACTITIONER

## 2025-05-15 RX ORDER — HYDROCHLOROTHIAZIDE 25 MG/1
25 TABLET ORAL DAILY
Qty: 90 TABLET | Refills: 1 | Status: SHIPPED | OUTPATIENT
Start: 2025-05-15

## 2025-05-15 RX ORDER — PRAVASTATIN SODIUM 20 MG
20 TABLET ORAL NIGHTLY
Qty: 90 TABLET | Refills: 1 | Status: SHIPPED | OUTPATIENT
Start: 2025-05-15

## 2025-05-15 RX ORDER — AMLODIPINE BESYLATE 10 MG/1
10 TABLET ORAL DAILY
Qty: 90 TABLET | Refills: 1 | Status: SHIPPED | OUTPATIENT
Start: 2025-05-15

## 2025-05-15 SDOH — ECONOMIC STABILITY: FOOD INSECURITY: WITHIN THE PAST 12 MONTHS, THE FOOD YOU BOUGHT JUST DIDN'T LAST AND YOU DIDN'T HAVE MONEY TO GET MORE.: PATIENT DECLINED

## 2025-05-15 SDOH — ECONOMIC STABILITY: FOOD INSECURITY: WITHIN THE PAST 12 MONTHS, YOU WORRIED THAT YOUR FOOD WOULD RUN OUT BEFORE YOU GOT MONEY TO BUY MORE.: PATIENT DECLINED

## 2025-05-15 ASSESSMENT — ENCOUNTER SYMPTOMS
SHORTNESS OF BREATH: 0
ABDOMINAL PAIN: 0

## 2025-05-15 ASSESSMENT — PATIENT HEALTH QUESTIONNAIRE - PHQ9: DEPRESSION UNABLE TO ASSESS: PT REFUSES

## 2025-05-15 NOTE — PROGRESS NOTES
Gabriela Muller is a 84 y.o. female presents for    Chief Complaint   Patient presents with    Follow-up    .    ASSESSMENT and PLAN  Gabriela was seen today for follow-up.    Diagnoses and all orders for this visit:    Essential (primary) hypertension  Comments:  BP above goal today. Discussed medication adjustment; patient declined. She will monitor at home, and follow up in 6 months and if still high willing to adjust. Discussed parameters and discussed DASH diet. Red flag sx reviewed.  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    Obesity (BMI 30.0-34.9)    Gastroesophageal reflux disease without esophagitis  Comments:  started on omeprazole by GI. Tolerating well.             HISTORY OF PRESENT ILLNESS  Gabriela Muller is a 84 y.o. female presents for    Chief Complaint   Patient presents with    Follow-up    .    Hypertension: Currently on amlodipine 10 mg daily and HCTZ 25 mg daily. She takes the medications daily, she takes them after breakfast. She denies headaches, dizziness or chest pain. No leg swelling.    GERD: Patient was started on omeprazole by her GI doctor in January after an EGD and colonoscopy. They did this d/t low HGB.     She sees Endocrinology regularly, managing her thyroid medication.     Vitals:    05/15/25 1027 05/15/25 1046   BP: (!) 150/94 (!) 152/88   BP Site: Right Upper Arm    Patient Position: Sitting    BP Cuff Size: Large Adult    Pulse: 64    Resp: 16    TempSrc: Oral    SpO2: 96%    Weight: 88.9 kg (196 lb)    Height: 1.651 m (5' 5\")      Patient Active Problem List

## 2025-05-15 NOTE — PROGRESS NOTES
Have you been to the ER, urgent care clinic since your last visit?  Hospitalized since your last visit?   NO    Have you seen or consulted any other health care providers outside our system since your last visit?   NO    Chief Complaint   Patient presents with    Follow-up       BP (!) 150/94 (BP Site: Right Upper Arm, Patient Position: Sitting, BP Cuff Size: Large Adult)   Pulse 64   Resp 16   Ht 1.651 m (5' 5\")   Wt 88.9 kg (196 lb)   SpO2 96%   BMI 32.62 kg/m²

## 2025-06-19 ENCOUNTER — LAB (OUTPATIENT)
Age: 85
End: 2025-06-19

## 2025-06-19 DIAGNOSIS — E03.2 HYPOTHYROIDISM DUE TO MEDICAMENTS AND OTHER EXOGENOUS SUBSTANCES: ICD-10-CM

## 2025-06-21 LAB
T4 FREE SERPL-MCNC: 2.5 NG/DL (ref 0.8–1.5)
TSH SERPL DL<=0.05 MIU/L-ACNC: <0.01 UIU/ML (ref 0.36–3.74)

## 2025-06-27 ENCOUNTER — OFFICE VISIT (OUTPATIENT)
Age: 85
End: 2025-06-27
Payer: MEDICARE

## 2025-06-27 VITALS
SYSTOLIC BLOOD PRESSURE: 170 MMHG | TEMPERATURE: 97.4 F | OXYGEN SATURATION: 97 % | WEIGHT: 196.6 LBS | HEIGHT: 65 IN | HEART RATE: 67 BPM | BODY MASS INDEX: 32.76 KG/M2 | RESPIRATION RATE: 18 BRPM | DIASTOLIC BLOOD PRESSURE: 90 MMHG

## 2025-06-27 DIAGNOSIS — E03.2 HYPOTHYROIDISM DUE TO MEDICAMENTS AND OTHER EXOGENOUS SUBSTANCES: ICD-10-CM

## 2025-06-27 DIAGNOSIS — E03.2 HYPOTHYROIDISM DUE TO MEDICATION: ICD-10-CM

## 2025-06-27 DIAGNOSIS — E05.90 HYPERTHYROIDISM: Primary | ICD-10-CM

## 2025-06-27 DIAGNOSIS — E04.2 MULTINODULAR GOITER: ICD-10-CM

## 2025-06-27 PROCEDURE — 1123F ACP DISCUSS/DSCN MKR DOCD: CPT | Performed by: INTERNAL MEDICINE

## 2025-06-27 PROCEDURE — 1090F PRES/ABSN URINE INCON ASSESS: CPT | Performed by: INTERNAL MEDICINE

## 2025-06-27 PROCEDURE — G8417 CALC BMI ABV UP PARAM F/U: HCPCS | Performed by: INTERNAL MEDICINE

## 2025-06-27 PROCEDURE — 1126F AMNT PAIN NOTED NONE PRSNT: CPT | Performed by: INTERNAL MEDICINE

## 2025-06-27 PROCEDURE — G8399 PT W/DXA RESULTS DOCUMENT: HCPCS | Performed by: INTERNAL MEDICINE

## 2025-06-27 PROCEDURE — 99214 OFFICE O/P EST MOD 30 MIN: CPT | Performed by: INTERNAL MEDICINE

## 2025-06-27 PROCEDURE — G8427 DOCREV CUR MEDS BY ELIG CLIN: HCPCS | Performed by: INTERNAL MEDICINE

## 2025-06-27 PROCEDURE — 3079F DIAST BP 80-89 MM HG: CPT | Performed by: INTERNAL MEDICINE

## 2025-06-27 PROCEDURE — 1036F TOBACCO NON-USER: CPT | Performed by: INTERNAL MEDICINE

## 2025-06-27 PROCEDURE — 3077F SYST BP >= 140 MM HG: CPT | Performed by: INTERNAL MEDICINE

## 2025-06-27 RX ORDER — LEVOTHYROXINE SODIUM 50 UG/1
TABLET ORAL
Qty: 80 TABLET | Refills: 1 | Status: SHIPPED | OUTPATIENT
Start: 2025-06-27

## 2025-06-27 NOTE — PATIENT INSTRUCTIONS
Levothyroxine  50  mcg  6 days a week  and  stop on Sunday , on empty stomach with water only, no other meds or food or drinks   For next half hour       Take any kind of vitamins, calcium, iron   Pills  4 hours later

## 2025-06-27 NOTE — PROGRESS NOTES
Chief Complaint   Patient presents with    Follow-up     \"Have you been to the ER, urgent care clinic since your last visit?  Hospitalized since your last visit?\"    NO    “Have you seen or consulted any other health care providers outside of [facility] since your last visit?”    NO

## 2025-06-27 NOTE — PROGRESS NOTES
[facility] DIABETES AND ENDOCRINOLOGY              Harriett Melendrez MD FACE         HISTORY OF PRESENT ILLNESS  Gabriela Muller is a 84  y.o. female.  HPI  A year of  F/u after last visit for hypothyroidism   AND   MNG   from June 2024    Sees hematology      Compliant with medication - Levothyroxine    June 2024    Very nice , pleasant patient  , who has good memory  of  events is here  She did nto get labs done or the usg       June 2023   C/o cold symptoms   She takes synthroid      Old history     Very pleasant lady   Pt changed to hypothyroidism from hyperthyroidism   She is asymptomatic   Some confusion occured at pharmacy   She caught it early on       Review of Systems   Constitutional: Negative.    Psychiatric/Behavioral: Negative for depression and memory loss. The patient does not have insomnia.        Physical Exam   Constitutional: She is oriented to person, place, and time. She appears well-developed and well-nourished.   Neck: Normal range of motion. Neck supple. No JVD present. No tracheal deviation present. Thyromegaly present.   Psychiatric: She has a normal mood and affect.       Labs   Lab Results   Component Value Date    TSH <0.01 (L) 06/19/2025    T4FREE 2.5 (H) 06/19/2025        ASSESSMENT and PLAN    1. Iatrogenic  hyperthyroid  state -  she denies using excess of thyroid medication   ( Elderly pt  - how much to depend on history )   On  synthroid 50 mcg a day  thus far   June 2025  - decrease  levothyroxine  to 6 days a week, repeat labs in mid July   Seeing her sooner in 4 months with labs again    2.   Multiple nodular  Goiter : asymptomatic   S/p FNA of dominant nodule  Left side jan 2017  --  Got incomplete sample, but the cells drawn were Benign, reassured , Not considering to repeat right away   S/p FNA in office on left  in jan 2020 - NEGATIVE FOR CANCER   June 2021  : performed  FNA on another nodule on left side  - IST   - NON diagnostic  ( insufficient cell )   June 2022

## 2025-07-25 ENCOUNTER — LAB (OUTPATIENT)
Age: 85
End: 2025-07-25

## 2025-07-25 DIAGNOSIS — E05.90 HYPERTHYROIDISM: ICD-10-CM

## 2025-07-25 DIAGNOSIS — E03.2 HYPOTHYROIDISM DUE TO MEDICATION: ICD-10-CM

## 2025-07-25 DIAGNOSIS — E04.2 MULTINODULAR GOITER: ICD-10-CM

## 2025-07-25 LAB
T4 FREE SERPL-MCNC: 2.3 NG/DL (ref 0.8–1.5)
TSH SERPL DL<=0.05 MIU/L-ACNC: <0.01 UIU/ML (ref 0.36–3.74)